# Patient Record
Sex: MALE | Race: BLACK OR AFRICAN AMERICAN | Employment: PART TIME | URBAN - METROPOLITAN AREA
[De-identification: names, ages, dates, MRNs, and addresses within clinical notes are randomized per-mention and may not be internally consistent; named-entity substitution may affect disease eponyms.]

---

## 2024-01-26 PROBLEM — D64.9 ANEMIA, UNSPECIFIED TYPE: Status: ACTIVE | Noted: 2024-01-26

## 2024-01-26 PROBLEM — D69.6 THROMBOCYTOPENIA (HCC): Status: ACTIVE | Noted: 2024-01-26

## 2024-01-26 NOTE — H&P (VIEW-ONLY)
REFERRING PHYSICIAN: Dr. Ackerman ref. provider found    HPI:         Thank you very much for requesting me to see the patient.    As you know, Kai Hatfield is a 68 year old male who presents today with complains of progressive HANDY for last several weeks and chest pain/pressure.  Relates does not look at his BM's so is unaware of any BRBPR or black stools; no change in bowel habits; no abdominal pain; no wt loss. Takes daily ASA but otherwise no NSAID's. At presentation, noted to have Hb 4.6 and per ER physician, ISIS with \"brown stool with flecks of blood.\"            PMH: HTN; dyslipidemia; BPH.     PMH-GI:     As of1/26/2024: no prior colonoscopy.     SOC: single; quit tob;     Fhx: no colon ca; no ibd.         No past surgical history on file.  Social History     Socioeconomic History    Marital status: Single   Tobacco Use    Smoking status: Former     Types: Cigarettes    Smokeless tobacco: Never   Substance and Sexual Activity    Alcohol use: Yes    Drug use: Never         No current facility-administered medications for this encounter.       No Known Allergies    A comprehensive 10 point review of systems was completed.  Pertinent positives and negatives noted in the the HPI.    EXAM:  /84   Pulse 64   Temp 98.1 °F (36.7 °C) (Temporal)   Resp 16   Ht 5' 7\" (1.702 m)   Wt 170 lb (77.1 kg)   SpO2 100%   BMI 26.63 kg/m²  -Body mass index is 26.63 kg/m².  GENERAL: well developed, well nourished, in no apparent distress  SKIN: no rashes, no suspicious lesions  HEENT: anicteric; no JVD.  NECK: supple, no adenopathy, no bruits  LUNGS: clear to auscultation  CARDIO: RRR, nl s1 and s2. No murmers appreciated.  GI: Soft, NT, ND, normal BS. NO HSM, masses, hernias or bruits.  EXTREMITIES: no cyanosis, clubbing or edema    ___________________________________________________________    ASSESSMENT: In summary this is a 68 year old male who presents today with progressive generalized weakness and severe new microcytic  anemia Hb 4.6 (MCV 64.8) (normal CBC 2/2021).     ACTIVE ISSUES INCLUDE:     Severe new microcytic anemia / thrombocytopenia -- ISIS with brown stool with \"flecks of blood.\" No prior endoscopic evaluation. Normal PT / INR; normal LFT's.     -- Ddx: broad including upper gi source and colonic pathology including malignancy.   -- iron studies pending   -- transfuse to Hb > 7   -- EGD / colonoscopy with MAC (monitored anesthesia care) tomorrow or pending transfusion (would transfuse to >7 prior to start of prep).   -- empiric ppi   -- clear liquid diet tonight     2.HTN    3. Thrombocytopenia -- repeat CBC in am          The patient indicates understanding of these issues and agrees to the plan. Thank you!       Alfie Carty M.D.       [facility]  ___________________________________________________________

## 2024-01-26 NOTE — ED INITIAL ASSESSMENT (HPI)
Patient arrives ambulatory through triage with complaints shortness of breath and weakness \"for a couple weeks\".

## 2024-01-26 NOTE — ED QUICK NOTES
Orders for admission, patient is aware of plan and ready to go upstairs. Any questions, please call ED RN Akilah at extension [phone].     Patient Covid vaccination status: Unvaccinated     COVID Test Ordered in ED: SARS-CoV-2/Flu A and B/RSV by PCR (GeneXpert)    COVID Suspicion at Admission: N/A    Running Infusions:  None    Mental Status/LOC at time of transport: A&Ox4    Other pertinent information:   CIWA score: N/A   NIH score:  N/A

## 2024-01-26 NOTE — H&P
[facility] Hospitalist H&P       CC:   Chief Complaint   Patient presents with    Breathing Problem        PCP: No primary care provider on file.    History of Present Illness: Patient is a 68 year old male with PMH sig for HTN, HLD, and BPH who presented to the hospital with chest discomfort, weakness, and dyspnea on exertion. Patient states that his symptoms started a few weeks prior and have been progressively worsening. He becomes dyspneic when walking up a flight of stairs. Dizziness when standing that resolves with time and also progressive weakness. In addition he noticed that he was having some epigastric abdominal pain that started at the same time. Denied any recent NSAID use but has been taking ASA daily for about 1 year. Having some chest pressure, this he states is chronic and has been w/u in the past. No hx of prior colonoscopy. No family hx of cancer. Denies any unintentional weight loss but has been having night sweats for a few weeks. Appetite is normal. Upon arrival to the ED, initial vitals were stable. Labwork showed hemoglobin of 4.6 ( previous of 13.9) and platelets of 327. Rectal examination performed in the ED had brown stool with flecks of blood. Patient said that he has not paid attention the the color of his stool so he does not know.     PMH  Past Medical History:   Diagnosis Date    Essential hypertension         PSH  No past surgical history on file.     ALL:  No Known Allergies     Home Medications:  No outpatient medications have been marked as taking for the 1/26/24 encounter (Hospital Encounter).         Soc Hx  Social History     Tobacco Use    Smoking status: Former     Types: Cigarettes    Smokeless tobacco: Never   Substance Use Topics    Alcohol use: Yes        Fam Hx  History reviewed. No pertinent family history.    Review of Systems  Comprehensive ROS reviewed and negative except for what's stated above.     OBJECTIVE:  /77   Pulse 66   Temp 97.8 °F (36.6  °C)   Resp 16   Ht 5' 7\" (1.702 m)   Wt 170 lb (77.1 kg)   SpO2 100%   BMI 26.63 kg/m²   General:  Alert, no distress   Head:  Normocephalic               Neck: Supple   Lungs:   Clear to auscultation bilaterally.       Heart:  Regular rate and rhythm, S1, S2 normal,   Abdomen:   Soft, epigastric TTP   Extremities: Extremities normal             Diagnostic Data:    CBC/Chem  Recent Labs   Lab 01/26/24  1213   WBC 5.2   HGB 4.6*   MCV 64.8*   PLT 76.0*       Recent Labs   Lab 01/26/24  1148      K 3.9      CO2 24.0   BUN 12   CREATSERUM 0.99   GLU 89   CA 9.3       Recent Labs   Lab 01/26/24  1148   ALT <7*   AST 13   ALB 4.3       No results for input(s): \"TROP\" in the last 168 hours.    Radiology: XR CHEST AP PORTABLE  (CPT=71045)    Result Date: 1/26/2024  CONCLUSION:  1. No acute disease in the chest.    Dictated by (CST): Oleksandr Sanchez MD on 1/26/2024 at 12:32 PM     Finalized by (CST): Oleksandr Sanchez MD on 1/26/2024 at 12:33 PM             ASSESSMENT / PLAN:   Patient is a 68 year old male with PMH sig for HTN, HLD, and BPH who presented to the hospital with chest discomfort, weakness, and dyspnea on exertion.     Acute anemia  - hgb of 4.6 on admission, previous was 13.9 in 2021  - possible UGIB, has epigastric tenderness and takes ASA daily however no hx of prior colonoscopy and has been having night sweats for a few weeks?   - rectal exam in ED with brown stool and flecks of blood  - transfusing 2 units of PRBC  - vitals stable reviewed and stable  - CBC BID  - check iron studies, ferritin, b12  - GI consulted, appreciate recommendations  - CLD, NPO at midnight  - protonix 40 mg BID    Thrombocytopenia  - platelets of 76 on admission, previous was 327  - check iron studies, PBS, coags  - repeat in the morning, if worsened may need hem onc consult    HTN  - resume home medications    HLD  - resume home medications    BPH  - resume home medications    FN:  - IVF: NS  - Diet: CLD, NPO at  midnight    DVT Prophy: SCD  Lines: PIV    Dispo: pending clinical course    Outpatient records or previous hospital records reviewed.     Further recommendations pending patient's clinical course.  [facility] hospitalist to continue to follow patient while in house    Patient and/or patient's family given opportunity to ask questions and note understanding and agreeing with therapeutic plan as outlined    Thank You,  Lelo Mattson DO    Hospitalist with [facility] Service number: [phone]

## 2024-01-26 NOTE — ED PROVIDER NOTES
[facility]  Emergency Department Attending Note     Chief Complaint:   Chief Complaint   Patient presents with    Breathing Problem     HISTORY OF PRESENT ILLNESS:   Kai Hatfield is a 68 year old male who presents to the ED with a past medical history of hypertension presents for increasing shortness of breath over the last couple of weeks.  Denies chest pain.  Some increased fatigue.  Denies nausea vomiting diarrhea.  He is unsure if he has had any blood in his stool.  No fever or chills.  Denies any headache or vision changes or slurred speech paralysis or paresthesia.     MEDICAL & SOCIAL HISTORY:   Past Medical History:   Diagnosis Date    Essential hypertension     No past surgical history on file.   Social History     Socioeconomic History    Marital status: Single   Tobacco Use    Smoking status: Former     Types: Cigarettes    Smokeless tobacco: Never   Substance and Sexual Activity    Alcohol use: Yes    Drug use: Never    No Known Allergies   No current outpatient medications on file.          REVIEW OF SYSTEMS   A 10 point review of systems was completed and is negative except as listed in history of present illness      PHYSICAL EXAM   Vitals: /69   Pulse 69   Temp 97.8 °F (36.6 °C)   Resp (!) 27   Ht 170.2 cm (5' 7\")   Wt 77.1 kg   SpO2 100%   BMI 26.63 kg/m²   /69   Pulse 69   Temp 97.8 °F (36.6 °C)   Resp (!) 27   Ht 170.2 cm (5' 7\")   Wt 77.1 kg   SpO2 100%   BMI 26.63 kg/m²     General: A&Ox3, NAD  Constitutional: Well developed, well nourished, nontoxic  Head: atraumatic, normocephalic   Eyes: conjuctiva clear, no icterus, PERRL, EOMI, vision grossly normal  Ears: normal external appearance, no drainage  Nose:  Atraumatic, no swelling, no drainage, nares patent  Throat:  Moist pink mucous membranes, airway is patent  Neck:  Soft supple, no masses, no tracheal deviation, no stridor  Chest:  No bruising or abrasions, no tenderness, no deformity  Cardiac:  Regular  rate and rhythm, no murmurs rubs or gallops.  Lung:  No distress, no retractions. Clear to auscultation bilaterally, no w/r/r  Abdomen:  Soft, nontender, nondistended, normal BS rectal exam with brown stool and flecks of blood no active bleeding  Back: No stepoff/deformity  Extremities: FROM all ext, no deformities, intact equal peripheral pulses, no cyanosis or edema  Neuro: No facial droop, no slurred speech, moving all extremities freely, SILT to the bilateral upper and lower extremities  Psych: A&Ox3, normal affect, cooperative, calm  Skin: No rash, no petechiae/purpura, warm, dry      RESULTS  LABS:   Results for orders placed or performed during the hospital encounter of 01/26/24   Comp Metabolic Panel (14)   Result Value Ref Range    Glucose 89 70 - 99 mg/dL    Sodium 138 136 - 145 mmol/L    Potassium 3.9 3.5 - 5.1 mmol/L    Chloride 106 98 - 112 mmol/L    CO2 24.0 21.0 - 32.0 mmol/L    Anion Gap 8 0 - 18 mmol/L    BUN 12 9 - 23 mg/dL    Creatinine 0.99 0.70 - 1.30 mg/dL    BUN/CREA Ratio 12.1 10.0 - 20.0    Calcium, Total 9.3 8.7 - 10.4 mg/dL    Calculated Osmolality 285 275 - 295 mOsm/kg    eGFR-Cr 83 >=60 mL/min/1.73m2    ALT <7 (L) 10 - 49 U/L    AST 13 <=34 U/L    Alkaline Phosphatase 58 45 - 117 U/L    Bilirubin, Total 0.7 0.2 - 1.1 mg/dL    Total Protein 7.2 5.7 - 8.2 g/dL    Albumin 4.3 3.2 - 4.8 g/dL    Globulin  2.9 2.8 - 4.4 g/dL    A/G Ratio 1.5 1.0 - 2.0   Troponin I (High Sensitivity)   Result Value Ref Range    Troponin I (High Sensitivity) 4 <=53 ng/L   BNP (Brain Natriuretic Peptide)   Result Value Ref Range    Beta Natriuretic Peptide 51 <100 pg/mL   Scan slide   Result Value Ref Range    Slide Review       Platelets reviewed on smear. No giant platelets or platelet clumps observed.   EKG 12 Lead   Result Value Ref Range    Ventricular rate 67 BPM    Atrial rate 67 BPM    P-R Interval 178 ms    QRS Duration 70 ms    Q-T Interval 372 ms    QTC Calculation (Bezet) 393 ms    P Axis 61 degrees     R Axis -9 degrees    T Axis 8 degrees   RAINBOW DRAW BLUE   Result Value Ref Range    Hold Blue Auto Resulted    SARS-CoV-2/Flu A and B/RSV by PCR (GeneXpert)    Specimen: Nares; Other   Result Value Ref Range    SARS-CoV-2 (COVID-19) - (GeneXpert) Not Detected Not Detected    Influenza A by PCR Negative Negative    Influenza B by PCR Negative Negative    RSV by PCR Negative Negative   CBC W/ DIFFERENTIAL   Result Value Ref Range    WBC 5.2 4.0 - 11.0 x10(3) uL    RBC 2.67 (L) 3.80 - 5.80 x10(6)uL    HGB 4.6 (LL) 13.0 - 17.5 g/dL    HCT 17.3 (L) 39.0 - 53.0 %    MCV 64.8 (L) 80.0 - 100.0 fL    MCH 17.2 (L) 26.0 - 34.0 pg    MCHC 26.6 (L) 31.0 - 37.0 g/dL    RDW-SD 67.4 (H) 35.1 - 46.3 fL    RDW 30.6 (H) 11.0 - 15.0 %    PLT 76.0 (L) 150.0 - 450.0 10(3)uL    Neutrophil Absolute Prelim 3.30 1.50 - 7.70 x10 (3) uL         IMAGING: XR CHEST AP PORTABLE  (CPT=71045)    Result Date: 1/26/2024  CONCLUSION:  1. No acute disease in the chest.    Dictated by (CST): Oleksandr Sanchez MD on 1/26/2024 at 12:32 PM     Finalized by (CST): Oleksandr Sanchez MD on 1/26/2024 at 12:33 PM           I personally reviewed the radiology study and my finding were no acute intrathoracic process      Procedures:   Procedures    EKG: Normal sinus rhythm with a normal rate of 67, normal intervals, normal QRS, nonspecific ST-T wave changes without overt signs of acute ischemia, no old immediately available for comparison       ED COURSE          Re-Evaluation: Improved      Disposition & Plan:   Clinical Impression/Final Diagnosis:   1. Anemia, unspecified type        Medical Decision Making: Patient noted to be profoundly anemic, not tachycardic or hypotensive, at this time not tachypneic either, not short of breath when he is at rest, only exertional.  Concern for symptomatic anemia will emergently transfused 2 units of packed red blood cells.  No active GI bleed GI is on consult will give Protonix.  Admit for further workup and  management    Medical Decision Making  Amount and/or Complexity of Data Reviewed  Independent Historian:      Details: Outside hospital records  External Data Reviewed: notes.  Labs: ordered. Decision-making details documented in ED Course.  Radiology: ordered and independent interpretation performed. Decision-making details documented in ED Course.  ECG/medicine tests: ordered and independent interpretation performed. Decision-making details documented in ED Course.    Risk  OTC drugs.  Prescription drug management.  Decision regarding hospitalization.    Critical Care  Total time providing critical care: 42 minutes        Disposition: Admit  There are no disposition comments on file for this visit.     This note was generated in part using voice recognition dictation technology. The report was reviewed by this physician but still may have unintentional errors due to inherent limitations of voice recognition technology. All times are estimates.

## 2024-01-26 NOTE — CONSULTS
REFERRING PHYSICIAN: Dr. Ackerman ref. provider found    HPI:         Thank you very much for requesting me to see the patient.    As you know, Kai Hatfield is a 68 year old male who presents today with complains of progressive HANDY for last several weeks and chest pain/pressure.  Relates does not look at his BM's so is unaware of any BRBPR or black stools; no change in bowel habits; no abdominal pain; no wt loss. Takes daily ASA but otherwise no NSAID's. At presentation, noted to have Hb 4.6 and per ER physician, ISIS with \"brown stool with flecks of blood.\"            PMH: HTN; dyslipidemia; BPH.     PMH-GI:     As of1/26/2024: no prior colonoscopy.     SOC: single; quit tob;     Fhx: no colon ca; no ibd.         No past surgical history on file.  Social History     Socioeconomic History    Marital status: Single   Tobacco Use    Smoking status: Former     Types: Cigarettes    Smokeless tobacco: Never   Substance and Sexual Activity    Alcohol use: Yes    Drug use: Never         No current facility-administered medications for this encounter.       No Known Allergies    A comprehensive 10 point review of systems was completed.  Pertinent positives and negatives noted in the the HPI.    EXAM:  /84   Pulse 64   Temp 98.1 °F (36.7 °C) (Temporal)   Resp 16   Ht 5' 7\" (1.702 m)   Wt 170 lb (77.1 kg)   SpO2 100%   BMI 26.63 kg/m²  -Body mass index is 26.63 kg/m².  GENERAL: well developed, well nourished, in no apparent distress  SKIN: no rashes, no suspicious lesions  HEENT: anicteric; no JVD.  NECK: supple, no adenopathy, no bruits  LUNGS: clear to auscultation  CARDIO: RRR, nl s1 and s2. No murmers appreciated.  GI: Soft, NT, ND, normal BS. NO HSM, masses, hernias or bruits.  EXTREMITIES: no cyanosis, clubbing or edema    ___________________________________________________________    ASSESSMENT: In summary this is a 68 year old male who presents today with progressive generalized weakness and severe new microcytic  anemia Hb 4.6 (MCV 64.8) (normal CBC 2/2021).     ACTIVE ISSUES INCLUDE:     Severe new microcytic anemia / thrombocytopenia -- ISIS with brown stool with \"flecks of blood.\" No prior endoscopic evaluation. Normal PT / INR; normal LFT's.     -- Ddx: broad including upper gi source and colonic pathology including malignancy.   -- iron studies pending   -- transfuse to Hb > 7   -- EGD / colonoscopy with MAC (monitored anesthesia care) tomorrow or pending transfusion (would transfuse to >7 prior to start of prep).   -- empiric ppi   -- clear liquid diet tonight     2.HTN    3. Thrombocytopenia -- repeat CBC in am          The patient indicates understanding of these issues and agrees to the plan. Thank you!       Alfie Carty M.D.       [facility]  ___________________________________________________________

## 2024-01-27 NOTE — PROGRESS NOTES
[facility] Hospitalist Progress Note     CC: Hospital Follow up    PCP: No primary care provider on file.       Assessment/Plan:   68 year old male with PMH sig for HTN, HLD, and BPH who presented to the hospital with chest discomfort, weakness, and dyspnea on exertion.      Acute anemia  Severe iron deficiency anemia   - hgb of 4.6 on admission, previous was 13.9 in 2021  - transfused 3 units of PRBC  - check iron studies, ferritin, b12  - GI consulted, appreciate recommendations  - ok to resume diet, EGD with normal findings, as was colonoscopy   - will need capsule small bowel study, either inpatient or outpatient  - will see how he is by tomorrow and decide on DC timing  - will give IV iron while here few doses   - protonix 40 mg BID     Thrombocytopenia  - platelets of 76 on admission, previous was 327  - check iron studies, PBS, coags  - repeat in the morning, if worsened may need hem onc consult     HTN  - hold losartan today      HLD  - can hold his statin        Fluids: can stop IV fluids  DVT prophylaxis: hold   Code status: FULL    Asrar Marco HURST  [facility] Hospitalist      Subjective:     Feels better today     OBJECTIVE:    Blood pressure 135/81, pulse 61, temperature 97.3 °F (36.3 °C), resp. rate 22, height 5' 7\" (1.702 m), weight 172 lb 13.5 oz (78.4 kg), SpO2 99%.    Temp:  [96.8 °F (36 °C)-98.1 °F (36.7 °C)] 97.3 °F (36.3 °C)  Pulse:  [52-89] 61  Resp:  [12-22] 22  BP: ()/(48-91) 135/81  SpO2:  [97 %-100 %] 99 %      Intake/Output:    Intake/Output Summary (Last 24 hours) at 1/27/2024 1326  Last data filed at 1/27/2024 0323  Gross per 24 hour   Intake 1223.75 ml   Output 300 ml   Net 923.75 ml       Last 3 Weights   01/26/24 2312 172 lb 13.5 oz (78.4 kg)   01/26/24 1044 170 lb (77.1 kg)       /81   Pulse 61   Temp 97.3 °F (36.3 °C)   Resp 22   Ht 5' 7\" (1.702 m)   Wt 172 lb 13.5 oz (78.4 kg)   SpO2 99%   BMI 27.07 kg/m²   General: Alert, no acute  distres  Neck: non tender, no adenopathy   Lungs: clear to ausculation bilaterally  Heart: Regular rate and rhythm  Extremities: No edema        Data Review:       Labs:     Recent Labs   Lab 01/26/24  1213 01/26/24 2013 01/26/24  2319 01/27/24  0300 01/27/24  0943   RBC 2.67*  --  3.36* 3.61* 4.21   HGB 4.6*   < > 6.8* 7.5* 9.0*   HCT 17.3*   < > 23.8* 26.1* 30.5*   MCV 64.8*  --  70.8* 72.3* 72.4*   MCH 17.2*  --  20.2* 20.8* 21.4*   MCHC 26.6*  --  28.6* 28.7* 29.5*   RDW 30.6*  --  30.6* 29.4* 29.9*   NEPRELIM 3.30  --  4.11 3.57  --    WBC 5.2  --  6.6 6.1 7.8   PLT 76.0*  --  94.0* 97.0* 118.0*    < > = values in this interval not displayed.         Recent Labs   Lab 01/26/24  1148 01/27/24  0300   GLU 89 87   BUN 12 9   CREATSERUM 0.99 0.99   EGFRCR 83 83   CA 9.3 8.8    144   K 3.9 4.3    114*   CO2 24.0 25.0       Recent Labs   Lab 01/26/24  1148 01/27/24  0300   ALT <7* 9*   AST 13 14   ALB 4.3 3.9         Imaging:  XR CHEST AP PORTABLE  (CPT=71045)    Result Date: 1/26/2024  CONCLUSION:  1. No acute disease in the chest.    Dictated by (CST): Oleksandr Sanchez MD on 1/26/2024 at 12:32 PM     Finalized by (CST): Oleksandr Sanchez MD on 1/26/2024 at 12:33 PM             Meds:      sodium chloride   Intravenous Once    pantoprazole  40 mg Intravenous Q12H      lactated ringers      sodium chloride 83 mL/hr at 01/27/24 1324     naloxone, ondansetron, metoclopramide

## 2024-01-27 NOTE — ANESTHESIA POSTPROCEDURE EVALUATION
Patient: Kai Hatfield    Procedure Summary       Date: 01/27/24 Room / Location: [facility] ENDOSCOPY 01 / [facility] ENDOSCOPY    Anesthesia Start: 1145 Anesthesia Stop:     Procedures:       ESOPHAGOGASTRODUODENOSCOPY (EGD)      COLONOSCOPY Diagnosis: (normal egd, colon polyp, hemorrhoids)    Surgeons: Alfie Carty MD Anesthesiologist: Dakota Lindquist MD    Anesthesia Type: MAC ASA Status: 3            Anesthesia Type: No value filed.    Vitals Value Taken Time   /48 01/27/24 1220   Temp 97.3 °F (36.3 °C) 01/27/24 1220   Pulse 78 01/27/24 1220   Resp 14 01/27/24 1220   SpO2 97 % 01/27/24 1220       [facility] AN Post Evaluation:   Patient Evaluated in PACU  Patient Participation: complete - patient participated  Level of Consciousness: awake  Pain Management: adequate  Airway Patency:patent  Dental exam unchanged from preop  Yes    Cardiovascular Status: acceptable  Respiratory Status: acceptable  Postoperative Hydration acceptable      DAKOTA LINDQUIST MD  1/27/2024 12:21 PM

## 2024-01-27 NOTE — OPERATIVE REPORT
ENDOSCOPY OPERATIVE REPORT  Patient Name: Kai Hatfield  Medical Record #: [id number]  YOB: 1955  Date of Procedure: 1/27/2024    Preoperative Diagnosis: severe new microcytic anemia; rectal bleeding.   Postoperative Diagnosis:       1.) Normal EGD. Duodenal biopsies obtained.     2.) Diminutive 3 mm hepatic flexure polyp = removed.     3.) Normal colon otherwise. Normal terminal ileum.    4.) Internal hemorrhoids.   Procedure Performed: Esophagogastroduodenoscopy with biopsy and Colonoscopy with biopsy. Withdrawal time: 10  minutes.   ASA Class: 2. Anesthesia Given: MAC. Moderate sedation time: NA. Deep sedation was provided by anesthesiologist.   Endoscopist: Alfie Carty MD  Procedure: After the patient was interviewed and the procedure again discussed and questions addressed, the patient was brought to the GI Lab and monitoring of the B/P, pulse, and pulse oximetry was performed. The patient was then placed in the left lateral decubitus position and sedated with divided doses of IV medication; continuous vital signs were monitored throughout the procedure. A time-out procedure was performed, history and physical were reviewed, medical reconciliation was complete, informed consent was obtained.   The Olympus videogastroscope was intubated into the esophagus and advanced into the duodenum under directed vision without difficulty. Then withdrawn. The patient was repositioned and prepared for the colonoscopy. The scope was inserted through the rectum and advanced to the cecum as identified by the appendiceal orifice and ileocecal valve.  The terminal ileum was intubated and was normal. The quality of the preparation was Aronchick score 1. A thorough exam was performed throughout the procedures. The patient tolerated the procedures well.   Aronchick Bowel Prep:  Score:  1 = Excellent (>95% mucosa seen)   2 = Good (clear liquid up to 25% of mucosa, 90% mucosa seen)   3 = fair (semisolid stool not  suctioned, but 90% mucosa seen)   4 = poor (semisolid stool not suctioned, <90% mucosa seen)   5 = inadequate (repeat prep needed).  FINDINGS: The esophagus mucosa had an overall normal appearance. The gastric lumen was then entered and views of the gastric mucosa as well as retroverted views of the fundus.  A normal pylorus was passed and the duodenal bulb entered, the duodenal bulb and post-bulbar duodenum were unremarkable. Biopsies from the second part of duodenum were obtained. The scope was then withdrawn and the procedure terminated.   A colonoscopy was then performed. A single small polyp was noted as above and removed using cold biopsy forceps. No other pathology was appreciated. The overall visualized mucosal pattern of the colon was unremarkable. At the anal verge the endoscope was retroverted, internal hemorrhoids, no other abnormalities were indentified.  The procedure was tolerated well and upon completion and throughtout the vital signs were stable.  COMPLICATIONS: None.  PLAN: 1.) Await biopsy results.    2.) recommend wireless capsule endoscopy (maybe done as out-patient)   3.) resume diet.  Alfie Carty M.D.  [facility]

## 2024-01-27 NOTE — ANESTHESIA PREPROCEDURE EVALUATION
Anesthesia PreOp Note    HPI:     Kai Hatfield is a 68 year old male who presents for preoperative consultation requested by: Alfie Carty MD    Date of Surgery: 1/26/2024 - 1/27/2024    Procedure(s):  ESOPHAGOGASTRODUODENOSCOPY (EGD)  COLONOSCOPY  Indication: severe anemia    Relevant Problems   No relevant active problems       NPO:  Last Liquid Consumption Date: 12/27/24 (colytly prep)  Last Liquid Consumption Time: 0920 (rest of colytley prep)  Last Solid Consumption Date: 12/26/24  Last Solid Consumption Time: 0900  Last Liquid Consumption Date: 12/27/24 (colytly prep)          History Review:  Patient Active Problem List    Diagnosis Date Noted    Thrombocytopenia (HCC) 01/26/2024    Anemia, unspecified type 01/26/2024       Past Medical History:   Diagnosis Date    Essential hypertension     High blood pressure     High cholesterol        History reviewed. No pertinent surgical history.    Medications Prior to Admission   Medication Sig Dispense Refill Last Dose    aspirin 81 MG Oral Chew Tab Chew 1 tablet (81 mg total) by mouth daily.       losartan 50 MG Oral Tab Take 1 tablet (50 mg total) by mouth daily.        Current Facility-Administered Medications Ordered in Epic   Medication Dose Route Frequency Provider Last Rate Last Admin    sodium chloride 0.9% infusion   Intravenous Once Mert Wren MD        sodium chloride 0.9% infusion   Intravenous Continuous Lelo Mattson,  83 mL/hr at 01/26/24 2117 New Bag at 01/26/24 2117    ondansetron (Zofran) 4 MG/2ML injection 4 mg  4 mg Intravenous Q6H PRN eLlo Mattson,         metoclopramide (Reglan) 5 mg/mL injection 10 mg  10 mg Intravenous Q8H PRN Lelo Mattson,         pantoprazole (Protonix) 40 mg in sodium chloride 0.9% PF 10 mL IV push  40 mg Intravenous Q12H Lelo Mattson, DO         No current [facility]-ordered outpatient medications on file.       No Known Allergies    History reviewed. No pertinent family  history.  Social History     Socioeconomic History    Marital status: Single   Tobacco Use    Smoking status: Former     Types: Cigarettes    Smokeless tobacco: Never   Substance and Sexual Activity    Alcohol use: Yes    Drug use: Never       Available pre-op labs reviewed.  Lab Results   Component Value Date    WBC 7.8 01/27/2024    RBC 4.21 01/27/2024    HGB 9.0 (L) 01/27/2024    HCT 30.5 (L) 01/27/2024    MCV 72.4 (L) 01/27/2024    MCH 21.4 (L) 01/27/2024    MCHC 29.5 (L) 01/27/2024    RDW 29.9 (H) 01/27/2024    .0 (L) 01/27/2024     Lab Results   Component Value Date     01/27/2024    K 4.3 01/27/2024     (H) 01/27/2024    CO2 25.0 01/27/2024    BUN 9 01/27/2024    CREATSERUM 0.99 01/27/2024    GLU 87 01/27/2024    PGLU 95 01/27/2024    CA 8.8 01/27/2024     Lab Results   Component Value Date    INR 1.06 01/26/2024       Vital Signs:  Body mass index is 27.07 kg/m².   height is 1.702 m (5' 7\") and weight is 78.4 kg (172 lb 13.5 oz). His tympanic temperature is 97 °F (36.1 °C). His blood pressure is 145/88 and his pulse is 69. His respiration is 14 and oxygen saturation is 100%.   Vitals:    01/27/24 0800 01/27/24 0849 01/27/24 0900 01/27/24 1000   BP: 120/82  157/90 145/88   Pulse: 69  52 69   Resp: 18   14   Temp:  97 °F (36.1 °C)     TempSrc:  Tympanic     SpO2: 100%  100% 100%   Weight:       Height:            Anesthesia Evaluation     Patient summary reviewed and Nursing notes reviewed    Airway   Dental      Pulmonary - negative ROS   Cardiovascular   (+) hypertension    Neuro/Psych - negative ROS     GI/Hepatic/Renal      Comments: GI bleed, anemia    Endo/Other    Abdominal                  Anesthesia Plan:   ASA:  3  Plan:   MAC  Post-op Pain Management: IV analgesics  Informed Consent Plan and Risks Discussed With:  Patient  Use of Blood Products Discussed With:  Patient  Blood Product Use Consented    Discussed plan with:  Surgeon      I have informed Kai Hatfield and/or legal  guardian or family member of the nature of the anesthetic plan, benefits, risks including possible dental damage if relevant, major complications, and any alternative forms of anesthetic management.   All of the patient's questions were answered to the best of my ability. The patient desires the anesthetic management as planned.  CATRINA LINDQUIST MD  1/27/2024 11:32 AM  Present on Admission:   Thrombocytopenia (HCC)

## 2024-01-27 NOTE — PLAN OF CARE
Pt is alert & or x 3, following cammands, consents on chart for EGD and colonoscopy. 1 polyp removed and EGD NORmal from report from Endo Rn Kae. Pt is alert & or x 4, following cammands, up to bathroom, colytely finished stools were clear watery like in am before procedure., sinus r. . Pt was NPO, Dr Hameed advanc to cardiac diet. Pt tolerated diet well. No nausea, no c/o of pain. Up to bathroom serveral times in am.  Dr HAMEED updated pt of procedure. Family at bedside.   No skin issues with pt.   Repeat hgb 9.0 this am. Pt will transfer to medical floor when bed is avail. Pt and family updated of this.  Pt is up to bathroom, min assist, no assistive devices needed.  Steady on his feet.  Problem: Patient Centered Care  Goal: Patient preferences are identified and integrated in the patient's plan of care  Description: Interventions:  - What would you like us to know as we care for you?  - Provide timely, complete, and accurate information to patient/family  - Incorporate patient and family knowledge, values, beliefs, and cultural backgrounds into the planning and delivery of care  - Encourage patient/family to participate in care and decision-making at the level they choose  - Honor patient and family perspectives and choices  Outcome: Progressing     Problem: GASTROINTESTINAL - ADULT  Goal: Maintains or returns to baseline bowel function  Description: INTERVENTIONS:  - Assess bowel function  - Maintain adequate hydration with IV or PO as ordered and tolerated  - Evaluate effectiveness of GI medications  - Encourage mobilization and activity  - Obtain nutritional consult as needed  - Establish a toileting routine/schedule  - Consider collaborating with pharmacy to review patient's medication profile  Outcome: Progressing     Problem: SKIN/TISSUE INTEGRITY - ADULT  Goal: Skin integrity remains intact  Description: INTERVENTIONS  - Assess and document risk factors for pressure ulcer development  - Assess and document  skin integrity  - Monitor for areas of redness and/or skin breakdown  - Initiate interventions, skin care algorithm/standards of care as needed  Outcome: Progressing

## 2024-01-27 NOTE — PROGRESS NOTES
Double RN skin check done prior to transfer off Unit. Skin check performed by this RN and len ferrari    Wounds are as follows:no wounds present. Pt texted his daughter haley  room # [phone].    Will remain available for any further questions or concerns.

## 2024-01-27 NOTE — PRE-SEDATION ASSESSMENT
Physician Pre-Sedation Assessment    Pre-Sedation Assessment:    Sedation History: No Previous Sedaton Recieved and Airway Assessed    Cardiac: normal S1, S2  Respiratory: breath sounds clear bilaterally   Abdomen: soft, BS (+), non-tender    ASA Classification: 2. Patient with mild systemic disease    Plan: IV Sedation

## 2024-01-27 NOTE — INTERVAL H&P NOTE
Pre-op Diagnosis: severe anemia    The above referenced H&P was reviewed by Alfie Carty MD on 1/27/2024, the patient was examined and no significant changes have occurred in the patient's condition since the H&P was performed.  I discussed with the patient and/or legal representative the potential benefits, risks and side effects of this procedure; the likelihood of the patient achieving goals; and potential problems that might occur during recuperation.  I discussed reasonable alternatives to the procedure, including risks, benefits and side effects related to the alternatives and risks related to not receiving this procedure.  We will proceed with procedure as planned.

## 2024-01-28 NOTE — PLAN OF CARE
Patient is alert and oriented. Denies pain. On room air. Voiding freely. No reports of bloody stool. Ambulating independently. Plan for GI capsule study tomorrow, 1/29. IV iron given. Tolerating cardiac diet. Safety precautions in place.     Problem: Patient Centered Care  Goal: Patient preferences are identified and integrated in the patient's plan of care  Description: Interventions:  - What would you like us to know as we care for you?   - Provide timely, complete, and accurate information to patient/family  - Incorporate patient and family knowledge, values, beliefs, and cultural backgrounds into the planning and delivery of care  - Encourage patient/family to participate in care and decision-making at the level they choose  - Honor patient and family perspectives and choices  Outcome: Progressing     Problem: GASTROINTESTINAL - ADULT  Goal: Maintains or returns to baseline bowel function  Description: INTERVENTIONS:  - Assess bowel function  - Maintain adequate hydration with IV or PO as ordered and tolerated  - Evaluate effectiveness of GI medications  - Encourage mobilization and activity  - Obtain nutritional consult as needed  - Establish a toileting routine/schedule  - Consider collaborating with pharmacy to review patient's medication profile  Outcome: Progressing     Problem: SKIN/TISSUE INTEGRITY - ADULT  Goal: Skin integrity remains intact  Description: INTERVENTIONS  - Assess and document risk factors for pressure ulcer development  - Assess and document skin integrity  - Monitor for areas of redness and/or skin breakdown  - Initiate interventions, skin care algorithm/standards of care as needed  Outcome: Progressing     Problem: Patient/Family Goals  Goal: Patient/Family Long Term Goal  Description: Patient's Long Term Goal:     Interventions:  -   - See additional Care Plan goals for specific interventions  Outcome: Progressing  Goal: Patient/Family Short Term Goal  Description: Patient's Short Term  Goal:     Interventions:   -   - See additional Care Plan goals for specific interventions  Outcome: Progressing

## 2024-01-28 NOTE — PROGRESS NOTES
[facility] Hospitalist Progress Note     CC: Hospital Follow up    PCP: No primary care provider on file.       Assessment/Plan:   68 year old male with PMH sig for HTN, HLD, and BPH who presented to the hospital with chest discomfort, weakness, and dyspnea on exertion.      Acute anemia  Severe iron deficiency anemia   - hgb of 4.6 on admission, previous was 13.9 in 2021  - transfused 3 units of PRBC  - checked iron studies, ferritin, b12  - GI consulted, appreciate recommendations  - EGD with normal findings, as was colonoscopy   - will need capsule small bowel study, patient is agreeable to do here inpatient if offered.  - will give IV iron while here few doses   - protonix 40 mg BID     Thrombocytopenia  - platelets of 76 on admission, previous was 327  - severe iron deficiency noted     HTN  - hold losartan , BP's have been stable      HLD  - resume statin     BPH  -on flomax     Fluids: hold IV fluids  DVT prophylaxis: hold   Code status: FULL  Dispo: patient agreeable to SB capsule if can be done here, Monday. GI to see today    Gen Lara DO  [facility] Hospitalist      Subjective:     Feels ok. Discussed small bowel capsule. Hb 7.8 today. No evidence of melena of hematochezia overnight.     OBJECTIVE:    Blood pressure 123/76, pulse 61, temperature 98 °F (36.7 °C), temperature source Oral, resp. rate 20, height 5' 7\" (1.702 m), weight 172 lb 13.5 oz (78.4 kg), SpO2 99%.    Temp:  [97.3 °F (36.3 °C)-98.2 °F (36.8 °C)] 98 °F (36.7 °C)  Pulse:  [60-83] 61  Resp:  [12-22] 20  BP: (105-148)/(48-88) 123/76  SpO2:  [97 %-100 %] 99 %      Intake/Output:    Intake/Output Summary (Last 24 hours) at 1/28/2024 0948  Last data filed at 1/28/2024 0811  Gross per 24 hour   Intake 1528 ml   Output 400 ml   Net 1128 ml       Last 3 Weights   01/26/24 2312 172 lb 13.5 oz (78.4 kg)   01/26/24 1044 170 lb (77.1 kg)       /76 (BP Location: Right arm)   Pulse 61   Temp 98 °F (36.7 °C) (Oral)   Resp  20   Ht 5' 7\" (1.702 m)   Wt 172 lb 13.5 oz (78.4 kg)   SpO2 99%   BMI 27.07 kg/m²   General: Alert, no acute distres  Neck: non tender, no adenopathy   Lungs: clear to ausculation bilaterally  Heart: Regular rate and rhythm  Extremities: No edema    Data Review:       Labs:     Recent Labs   Lab 01/26/24  1213 01/26/24 2013 01/26/24  2319 01/27/24  0300 01/27/24  0943 01/28/24  0502   RBC 2.67*  --  3.36* 3.61* 4.21 3.77*   HGB 4.6*   < > 6.8* 7.5* 9.0* 7.8*   HCT 17.3*   < > 23.8* 26.1* 30.5* 27.0*   MCV 64.8*  --  70.8* 72.3* 72.4* 71.6*   MCH 17.2*  --  20.2* 20.8* 21.4* 20.7*   MCHC 26.6*  --  28.6* 28.7* 29.5* 28.9*   RDW 30.6*  --  30.6* 29.4* 29.9* 30.5*   NEPRELIM 3.30  --  4.11 3.57  --   --    WBC 5.2  --  6.6 6.1 7.8 9.6   PLT 76.0*  --  94.0* 97.0* 118.0* 176.0    < > = values in this interval not displayed.         Recent Labs   Lab 01/26/24  1148 01/27/24  0300 01/28/24  0502   GLU 89 87 95   BUN 12 9 9   CREATSERUM 0.99 0.99 1.06   EGFRCR 83 83 77   CA 9.3 8.8 9.1    144 142   K 3.9 4.3 3.7    114* 111   CO2 24.0 25.0 24.0       Recent Labs   Lab 01/26/24  1148 01/27/24  0300   ALT <7* 9*   AST 13 14   ALB 4.3 3.9         Imaging:  XR CHEST AP PORTABLE  (CPT=71045)    Result Date: 1/26/2024  CONCLUSION:  1. No acute disease in the chest.    Dictated by (CST): Oleksandr Sanchez MD on 1/26/2024 at 12:32 PM     Finalized by (CST): Oleksandr Sanchez MD on 1/26/2024 at 12:33 PM             Meds:      tamsulosin  0.4 mg Oral Daily @ 0700    atorvastatin  20 mg Oral Nightly    sodium chloride   Intravenous Once    iron sucrose  200 mg Intravenous Daily    pantoprazole  40 mg Intravenous Q12H         ondansetron, metoclopramide

## 2024-01-28 NOTE — PLAN OF CARE
No acute changes over the shift. Vitals are in the standard range. Patient is alert x4. Tolerating well to cardiac diet. Voiding freely. No complaints of any pain. Checking his hemoglobin in the morning. Call light with in reach. All safety measures in place.  Problem: Patient Centered Care  Goal: Patient preferences are identified and integrated in the patient's plan of care  Description: Interventions:  - - Provide timely, complete, and accurate information to patient/family  - Incorporate patient and family knowledge, values, beliefs, and cultural backgrounds into the planning and delivery of care  - Encourage patient/family to participate in care and decision-making at the level they choose  - Honor patient and family perspectives and choices  Outcome: Progressing     Problem: GASTROINTESTINAL - ADULT  Goal: Maintains or returns to baseline bowel function  Description: INTERVENTIONS:  - Assess bowel function  - Maintain adequate hydration with IV or PO as ordered and tolerated  - Evaluate effectiveness of GI medications  - Encourage mobilization and activity  - Obtain nutritional consult as needed  - Establish a toileting routine/schedule  - Consider collaborating with pharmacy to review patient's medication profile  Outcome: Progressing     Problem: SKIN/TISSUE INTEGRITY - ADULT  Goal: Skin integrity remains intact  Description: INTERVENTIONS  - Assess and document risk factors for pressure ulcer development  - Assess and document skin integrity  - Monitor for areas of redness and/or skin breakdown  - Initiate interventions, skin care algorithm/standards of care as needed  Outcome: Progressing     P

## 2024-01-28 NOTE — PROGRESS NOTES
GI  PROGRESS NOTE    SUBJECTIVE: no complaints; tolerating diet.       OBJECTIVE:  Temp:  [98 °F (36.7 °C)-98.2 °F (36.8 °C)] 98 °F (36.7 °C)  Pulse:  [60-83] 61  Resp:  [12-22] 20  BP: (117-148)/(72-85) 123/76  SpO2:  [98 %-100 %] 99 %  Exam  Gen: No acute distress, alert and oriented x3  Pulm: Lungs clear, normal respiratory effort  CV: Heart with regular rate and rhythm, no peripheral edema  Abd: Abdomen soft, NT; ND; (+) BS; no organomegaly, bowel sounds present    Labs:     Recent Labs   Lab 01/26/24  1148 01/26/24  1213 01/26/24  1213 01/26/24 2013 01/26/24  2319 01/27/24  0300 01/27/24  0943 01/28/24  0502   WBC  --  5.2  --   --  6.6 6.1 7.8 9.6   HGB  --  4.6*   < > 6.2* 6.8* 7.5* 9.0* 7.8*   MCV  --  64.8*  --   --  70.8* 72.3* 72.4* 71.6*   PLT  --  76.0*  --   --  94.0* 97.0* 118.0* 176.0   INR 1.06  --   --   --   --   --   --   --     < > = values in this interval not displayed.       Recent Labs   Lab 01/26/24  1148 01/27/24  0300 01/28/24  0502    144 142   K 3.9 4.3 3.7    114* 111   CO2 24.0 25.0 24.0   BUN 12 9 9   CREATSERUM 0.99 0.99 1.06   CA 9.3 8.8 9.1   GLU 89 87 95       Recent Labs   Lab 01/26/24  1148 01/27/24  0300   ALT <7* 9*   AST 13 14   ALB 4.3 3.9         Meds:      tamsulosin  0.4 mg Oral Daily @ 0700    atorvastatin  20 mg Oral Nightly    sodium chloride   Intravenous Once    iron sucrose  200 mg Intravenous Daily    pantoprazole  40 mg Intravenous Q12H       ondansetron, metoclopramide    _______________________________________________________________    IMPRESSION: Pt is a 68 M presently admitted 1/26/2024 with progressive generalized weakness and severe new microcytic anemia Hb 4.6 (MCV 64.8) (normal CBC 2/2021).      ACTIVE ISSUES INCLUDE:      Severe new microcytic anemia / thrombocytopenia -- ISIS with brown stool with \"flecks of blood.\" No prior endoscopic evaluation. Normal PT / INR; normal LFT's.      -- 1/27/2024: Normal EGD / normal colonoscopy except for  a diminutive polyp/internal hemorrhoids. Normal terminal ileum.  -- recommend wireless capsule endoscopy tomorrow -- I/r including small change of capsule retention discussed with patient.      2.HTN     3. Thrombocytopenia -- repeat CBC in am --> resolved.     Alfie Carty M.D.  [facility]   _______________________________________________________________

## 2024-01-29 NOTE — PLAN OF CARE
VSS, afebrile. Swallowed video capsule this am. Advanced diet to general this afternoon, tolerating. BM x 1, no signs of blood per patient. Ambulating independently. No c/o pain. Voiding WNL. Safety plan in place, calling appropriately for assistance. Home upon discharge.       Problem: Patient Centered Care  Goal: Patient preferences are identified and integrated in the patient's plan of care  Description: Interventions:  - What would you like us to know as we care for you?   - Provide timely, complete, and accurate information to patient/family  - Incorporate patient and family knowledge, values, beliefs, and cultural backgrounds into the planning and delivery of care  - Encourage patient/family to participate in care and decision-making at the level they choose  - Honor patient and family perspectives and choices  Outcome: Progressing     Problem: GASTROINTESTINAL - ADULT  Goal: Maintains or returns to baseline bowel function  Description: INTERVENTIONS:  - Assess bowel function  - Maintain adequate hydration with IV or PO as ordered and tolerated  - Evaluate effectiveness of GI medications  - Encourage mobilization and activity  - Obtain nutritional consult as needed  - Establish a toileting routine/schedule  - Consider collaborating with pharmacy to review patient's medication profile  Outcome: Progressing     Problem: SKIN/TISSUE INTEGRITY - ADULT  Goal: Skin integrity remains intact  Description: INTERVENTIONS  - Assess and document risk factors for pressure ulcer development  - Assess and document skin integrity  - Monitor for areas of redness and/or skin breakdown  - Initiate interventions, skin care algorithm/standards of care as needed  Outcome: Progressing     Problem: Patient/Family Goals  Goal: Patient/Family Short Term Goal  Description: Patient's Short Term Goal: figure out if I am bleeding  Go home upon discharge    Monitor pain  Monitor VS  Monitor labs  administer medications  Keep patient updated  on plan of care  Attentive listening     - See additional Care Plan goals for specific interventions  Outcome: Progressing

## 2024-01-29 NOTE — PROGRESS NOTES
[facility] Hospitalist Progress Note     CC: Hospital Follow up    PCP: No primary care provider on file.       Assessment/Plan:   68 year old male with PMH sig for HTN, HLD, and BPH who presented to the hospital with chest discomfort, weakness, and dyspnea on exertion.      Acute anemia  Severe iron deficiency anemia   - hgb of 4.6 on admission, previous was 13.9 in 2021  - transfused 3 units of PRBC  - checked iron studies, ferritin, b12  - GI consulted, appreciate recommendations  - EGD with normal findings, as was colonoscopy   - capsule small bowel study, patient is agreeable to do here inpatient, started study this AM  - will give IV iron while here few doses (3 were scheduled)  - protonix 40 mg BID     Thrombocytopenia  - platelets of 76 on admission, previous was 327  - severe iron deficiency noted     HTN  - hold losartan , BP's have been stable      HLD  - resume statin     BPH  -on flomax     DVT prophylaxis: hold   Code status: FULL  Dispo: after small bowel capsule work up completed.     Gen Lara DO  [facility] Hospitalist      Subjective:     Feels ok.    OBJECTIVE:    Blood pressure 112/72, pulse 72, temperature 98 °F (36.7 °C), temperature source Oral, resp. rate 18, height 5' 7\" (1.702 m), weight 172 lb 13.5 oz (78.4 kg), SpO2 99%.    Temp:  [98 °F (36.7 °C)-100.5 °F (38.1 °C)] 98 °F (36.7 °C)  Pulse:  [61-74] 72  Resp:  [18] 18  BP: (112-134)/(68-81) 112/72  SpO2:  [97 %-100 %] 99 %      Intake/Output:    Intake/Output Summary (Last 24 hours) at 1/29/2024 1057  Last data filed at 1/28/2024 2021  Gross per 24 hour   Intake 500 ml   Output --   Net 500 ml       Last 3 Weights   01/26/24 2312 172 lb 13.5 oz (78.4 kg)   01/26/24 1044 170 lb (77.1 kg)       /72 (BP Location: Right arm)   Pulse 72   Temp 98 °F (36.7 °C) (Oral)   Resp 18   Ht 5' 7\" (1.702 m)   Wt 172 lb 13.5 oz (78.4 kg)   SpO2 99%   BMI 27.07 kg/m²   General: Alert, no acute distres  Neck: non tender,  no adenopathy   Lungs: clear to ausculation bilaterally  Heart: Regular rate and rhythm  Extremities: No edema    Data Review:       Labs:     Recent Labs   Lab 01/26/24  2319 01/27/24  0300 01/27/24  0943 01/28/24  0502 01/29/24  0604   RBC 3.36* 3.61* 4.21 3.77* 3.79*   HGB 6.8* 7.5* 9.0* 7.8* 8.4*   HCT 23.8* 26.1* 30.5* 27.0* 27.4*   MCV 70.8* 72.3* 72.4* 71.6* 72.3*   MCH 20.2* 20.8* 21.4* 20.7* 22.2*   MCHC 28.6* 28.7* 29.5* 28.9* 30.7*   RDW 30.6* 29.4* 29.9* 30.5*  --    NEPRELIM 4.11 3.57  --   --  8.66*   WBC 6.6 6.1 7.8 9.6 12.1*   PLT 94.0* 97.0* 118.0* 176.0 314.0         Recent Labs   Lab 01/26/24  1148 01/27/24  0300 01/28/24  0502   GLU 89 87 95   BUN 12 9 9   CREATSERUM 0.99 0.99 1.06   EGFRCR 83 83 77   CA 9.3 8.8 9.1    144 142   K 3.9 4.3 3.7    114* 111   CO2 24.0 25.0 24.0       Recent Labs   Lab 01/26/24  1148 01/27/24  0300   ALT <7* 9*   AST 13 14   ALB 4.3 3.9         Imaging:  XR CHEST AP PORTABLE  (CPT=71045)    Result Date: 1/26/2024  CONCLUSION:  1. No acute disease in the chest.    Dictated by (CST): Oleksandr Sanchez MD on 1/26/2024 at 12:32 PM     Finalized by (CST): Oleksandr Sanchez MD on 1/26/2024 at 12:33 PM             Meds:      tamsulosin  0.4 mg Oral Daily @ 0700    atorvastatin  20 mg Oral Nightly    sodium chloride   Intravenous Once    iron sucrose  200 mg Intravenous Daily    pantoprazole  40 mg Intravenous Q12H         ondansetron, metoclopramide

## 2024-01-29 NOTE — PAYOR COMM NOTE
--------------  ADMISSION REVIEW     Payor: DARIEN WOODSON [facility]  Subscriber #:  [id number]  Authorization Number: [id number]    Admit date: 1/26/24  Admit time:  6:23 PM       REVIEW DOCUMENTATION:     ED Provider Notes        [facility]  Emergency Department Attending Note     Chief Complaint:   Chief Complaint   Patient presents with    Breathing Problem     HISTORY OF PRESENT ILLNESS:   Kai Hatfield is a 68 year old male who presents to the ED with a past medical history of hypertension presents for increasing shortness of breath over the last couple of weeks.  Denies chest pain.  Some increased fatigue.  Denies nausea vomiting diarrhea.  He is unsure if he has had any blood in his stool.  No fever or chills.  Denies any headache or vision changes or slurred speech paralysis or paresthesia.     MEDICAL & SOCIAL HISTORY:                  PHYSICAL EXAM   Vitals: /69   Pulse 69   Temp 97.8 °F (36.6 °C)   Resp (!) 27   Ht 170.2 cm (5' 7\")   Wt 77.1 kg   SpO2 100%   BMI 26.63 kg/m²   /69   Pulse 69   Temp 97.8 °F (36.6 °C)   Resp (!) 27   Ht 170.2 cm (5' 7\")   Wt 77.1 kg   SpO2 100%   BMI 26.63 kg/m²     General: A&Ox3, NAD  Constitutional: Well developed, well nourished, nontoxic  Head: atraumatic, normocephalic   Eyes: conjuctiva clear, no icterus, PERRL, EOMI, vision grossly normal  Ears: normal external appearance, no drainage  Nose:  Atraumatic, no swelling, no drainage, nares patent  Throat:  Moist pink mucous membranes, airway is patent  Neck:  Soft supple, no masses, no tracheal deviation, no stridor  Chest:  No bruising or abrasions, no tenderness, no deformity  Cardiac:  Regular rate and rhythm, no murmurs rubs or gallops.  Lung:  No distress, no retractions. Clear to auscultation bilaterally, no w/r/r  Abdomen:  Soft, nontender, nondistended, normal BS rectal exam with brown stool and flecks of blood no active bleeding  Back: No stepoff/deformity  Extremities: FROM all ext, no  deformities, intact equal peripheral pulses, no cyanosis or edema  Neuro: No facial droop, no slurred speech, moving all extremities freely, SILT to the bilateral upper and lower extremities  Psych: A&Ox3, normal affect, cooperative, calm  Skin: No rash, no petechiae/purpura, warm, dry      RESULTS  LABS:   Results for orders placed or performed during the hospital encounter of 01/26/24   Comp Metabolic Panel (14)   Result Value Ref Range    Glucose 89 70 - 99 mg/dL    Sodium 138 136 - 145 mmol/L    Potassium 3.9 3.5 - 5.1 mmol/L    Chloride 106 98 - 112 mmol/L    CO2 24.0 21.0 - 32.0 mmol/L    Anion Gap 8 0 - 18 mmol/L    BUN 12 9 - 23 mg/dL    Creatinine 0.99 0.70 - 1.30 mg/dL    BUN/CREA Ratio 12.1 10.0 - 20.0    Calcium, Total 9.3 8.7 - 10.4 mg/dL    Calculated Osmolality 285 275 - 295 mOsm/kg    eGFR-Cr 83 >=60 mL/min/1.73m2    ALT <7 (L) 10 - 49 U/L    AST 13 <=34 U/L    Alkaline Phosphatase 58 45 - 117 U/L    Bilirubin, Total 0.7 0.2 - 1.1 mg/dL    Total Protein 7.2 5.7 - 8.2 g/dL    Albumin 4.3 3.2 - 4.8 g/dL    Globulin  2.9 2.8 - 4.4 g/dL    A/G Ratio 1.5 1.0 - 2.0   Troponin I (High Sensitivity)   Result Value Ref Range    Troponin I (High Sensitivity) 4 <=53 ng/L   BNP (Brain Natriuretic Peptide)   Result Value Ref Range    Beta Natriuretic Peptide 51 <100 pg/mL   Scan slide   Result Value Ref Range    Slide Review       Platelets reviewed on smear. No giant platelets or platelet clumps observed.   EKG 12 Lead   Result Value Ref Range    Ventricular rate 67 BPM    Atrial rate 67 BPM    P-R Interval 178 ms    QRS Duration 70 ms    Q-T Interval 372 ms    QTC Calculation (Bezet) 393 ms    P Axis 61 degrees    R Axis -9 degrees    T Axis 8 degrees   RAINBOW DRAW BLUE   Result Value Ref Range    Hold Blue Auto Resulted    SARS-CoV-2/Flu A and B/RSV by PCR (GeneXpert)    Specimen: Nares; Other   Result Value Ref Range    SARS-CoV-2 (COVID-19) - (GeneXpert) Not Detected Not Detected    Influenza A by PCR  Negative Negative    Influenza B by PCR Negative Negative    RSV by PCR Negative Negative   CBC W/ DIFFERENTIAL   Result Value Ref Range    WBC 5.2 4.0 - 11.0 x10(3) uL    RBC 2.67 (L) 3.80 - 5.80 x10(6)uL    HGB 4.6 (LL) 13.0 - 17.5 g/dL    HCT 17.3 (L) 39.0 - 53.0 %    MCV 64.8 (L) 80.0 - 100.0 fL    MCH 17.2 (L) 26.0 - 34.0 pg    MCHC 26.6 (L) 31.0 - 37.0 g/dL    RDW-SD 67.4 (H) 35.1 - 46.3 fL    RDW 30.6 (H) 11.0 - 15.0 %    PLT 76.0 (L) 150.0 - 450.0 10(3)uL    Neutrophil Absolute Prelim 3.30 1.50 - 7.70 x10 (3) uL         IMAGING: XR CHEST AP PORTABLE  (CPT=71045)    Result Date: 1/26/2024  CONCLUSION:  1. No acute disease in the chest.    Dictated by (CST): Oleksandr Sanchez MD on 1/26/2024 at 12:32 PM     Finalized by (CST): Oleksandr Sanchez MD on 1/26/2024 at 12:33 PM           I personally reviewed the radiology study and my finding were no acute intrathoracic process      Procedures:   Procedures    EKG: Normal sinus rhythm with a normal rate of 67, normal intervals, normal QRS, nonspecific ST-T wave changes without overt signs of acute ischemia, no old immediately available for comparison       ED COURSE          Re-Evaluation: Improved      Disposition & Plan:   Clinical Impression/Final Diagnosis:   1. Anemia, unspecified type        Medical Decision Making: Patient noted to be profoundly anemic, not tachycardic or hypotensive, at this time not tachypneic either, not short of breath when he is at rest, only exertional.  Concern for symptomatic anemia will emergently transfused 2 units of packed red blood cells.  No active GI bleed GI is on consult will give Protonix.  Admit for further workup and management    Medical Decision Making  Amount and/or Complexity of Data Reviewed  Independent Historian:      Details: Outside hospital records  External Data Reviewed: notes.  Labs: ordered. Decision-making details documented in ED Course.  Radiology: ordered and independent interpretation performed.  Decision-making details documented in ED Course.  ECG/medicine tests: ordered and independent interpretation performed. Decision-making details documented in ED Course.    Risk  OTC drugs.  Prescription drug management.  Decision regarding hospitalization.    Critical Care  Total time providing critical care: 42 minutes        Disposition: Admit  There are no disposition comments on file for this visit.     This note was generated in part using voice recognition dictation technology. The report was reviewed by this physician but still may have unintentional errors due to inherent limitations of voice recognition technology. All times are estimates.      Signed by Apryl Alvarenga MD on 1/26/2024  1:56 PM            H&P - H&P Note        H&P signed by Lelo Mattson DO at 1/26/2024  2:20 PM    CC:   Chief Complaint   Patient presents with    Breathing Problem        PCP: No primary care provider on file.    History of Present Illness: Patient is a 68 year old male with PMH sig for HTN, HLD, and BPH who presented to the hospital with chest discomfort, weakness, and dyspnea on exertion. Patient states that his symptoms started a few weeks prior and have been progressively worsening. He becomes dyspneic when walking up a flight of stairs. Dizziness when standing that resolves with time and also progressive weakness. In addition he noticed that he was having some epigastric abdominal pain that started at the same time. Denied any recent NSAID use but has been taking ASA daily for about 1 year. Having some chest pressure, this he states is chronic and has been w/u in the past. No hx of prior colonoscopy. No family hx of cancer. Denies any unintentional weight loss but has been having night sweats for a few weeks. Appetite is normal. Upon arrival to the ED, initial vitals were stable. Labwork showed hemoglobin of 4.6 ( previous of 13.9) and platelets of 327. Rectal examination performed in the ED had brown stool  with flecks of blood. Patient said that he has not paid attention the the color of his stool so he does not know.     PMH  Past Medical History:   Diagnosis Date    Essential hypertension         Review of Systems  Comprehensive ROS reviewed and negative except for what's stated above.     OBJECTIVE:  /77   Pulse 66   Temp 97.8 °F (36.6 °C)   Resp 16   Ht 5' 7\" (1.702 m)   Wt 170 lb (77.1 kg)   SpO2 100%   BMI 26.63 kg/m²   General:  Alert, no distress   Head:  Normocephalic               Neck: Supple   Lungs:   Clear to auscultation bilaterally.       Heart:  Regular rate and rhythm, S1, S2 normal,   Abdomen:   Soft, epigastric TTP   Extremities: Extremities normal             Diagnostic Data:    CBC/Chem  Recent Labs   Lab 01/26/24  1213   WBC 5.2   HGB 4.6*   MCV 64.8*   PLT 76.0*       Recent Labs   Lab 01/26/24  1148      K 3.9      CO2 24.0   BUN 12   CREATSERUM 0.99   GLU 89   CA 9.3       Recent Labs   Lab 01/26/24  1148   ALT <7*   AST 13   ALB 4.3       No results for input(s): \"TROP\" in the last 168 hours.    Radiology: XR CHEST AP PORTABLE  (CPT=71045)    Result Date: 1/26/2024  CONCLUSION:  1. No acute disease in the chest.    Dictated by (CST): Oleksandr Sanchez MD on 1/26/2024 at 12:32 PM     Finalized by (CST): Oleksandr Sanchez MD on 1/26/2024 at 12:33 PM             ASSESSMENT / PLAN:   Patient is a 68 year old male with PMH sig for HTN, HLD, and BPH who presented to the hospital with chest discomfort, weakness, and dyspnea on exertion.     Acute anemia  - hgb of 4.6 on admission, previous was 13.9 in 2021  - possible UGIB, has epigastric tenderness and takes ASA daily however no hx of prior colonoscopy and has been having night sweats for a few weeks?   - rectal exam in ED with brown stool and flecks of blood  - transfusing 2 units of PRBC  - vitals stable reviewed and stable  - CBC BID  - check iron studies, ferritin, b12  - GI consulted, appreciate recommendations  - CLD, NPO  at midnight  - protonix 40 mg BID    Thrombocytopenia  - platelets of 76 on admission, previous was 327  - check iron studies, PBS, coags  - repeat in the morning, if worsened may need hem onc consult    HTN  - resume home medications    HLD  - resume home medications    BPH  - resume home medications    FN:  - IVF: NS  - Diet: CLD, NPO at midnight    DVT Prophy: SCD  Lines: PIV    Dispo: pending clinical course    Outpatient records or previous hospital records reviewed.     Further recommendations pending patient's clinical course.  [facility] hospitalist to continue to follow patient while in house    Patient and/or patient's family given opportunity to ask questions and note understanding and agreeing with therapeutic plan as outlined    Thank You,  Lelo Mattson DO    Hospitalist with [facility]        Electronically signed by Lelo Mattson DO on 1/26/2024  2:20 PM           CONSULT  ASSESSMENT: In summary this is a 68 year old male who presents today with progressive generalized weakness and severe new microcytic anemia Hb 4.6 (MCV 64.8) (normal CBC 2/2021).      ACTIVE ISSUES INCLUDE:      Severe new microcytic anemia / thrombocytopenia -- ISIS with brown stool with \"flecks of blood.\" No prior endoscopic evaluation. Normal PT / INR; normal LFT's.      -- Ddx: broad including upper gi source and colonic pathology including malignancy.   -- iron studies pending   -- transfuse to Hb > 7   -- EGD / colonoscopy with MAC (monitored anesthesia care) tomorrow or pending transfusion (would transfuse to >7 prior to start of prep).   -- empiric ppi   -- clear liquid diet tonight      2.HTN     3. Thrombocytopenia -- repeat CBC in am          The patient indicates understanding of these issues and agrees to the plan. Thank you!       Alfie Carty M.D.       [facility]    1/27  [facility] Hospitalist Progress Note      CC: Hospital Follow up     PCP: No primary care  provider on file.         Assessment/Plan:   68 year old male with PMH sig for HTN, HLD, and BPH who presented to the hospital with chest discomfort, weakness, and dyspnea on exertion.      Acute anemia  Severe iron deficiency anemia   - hgb of 4.6 on admission, previous was 13.9 in 2021  - transfused 3 units of PRBC  - check iron studies, ferritin, b12  - GI consulted, appreciate recommendations  - ok to resume diet, EGD with normal findings, as was colonoscopy   - will need capsule small bowel study, either inpatient or outpatient  - will see how he is by tomorrow and decide on DC timing  - will give IV iron while here few doses   - protonix 40 mg BID     Thrombocytopenia  - platelets of 76 on admission, previous was 327  - check iron studies, PBS, coags  - repeat in the morning, if worsened may need hem onc consult     HTN  - hold losartan today      HLD  - can hold his statin         Fluids: can stop IV fluids  DVT prophylaxis: hold   Code status: FULL     [facility] [place] [facility] Hospitalist       Subjective:      Feels better today      OBJECTIVE:     Blood pressure 135/81, pulse 61, temperature 97.3 °F (36.3 °C), resp. rate 22, height 5' 7\" (1.702 m), weight 172 lb 13.5 oz (78.4 kg), SpO2 99%.     Temp:  [96.8 °F (36 °C)-98.1 °F (36.7 °C)] 97.3 °F (36.3 °C)  Pulse:  [52-89] 61  Resp:  [12-22] 22  BP: ()/(48-91) 135/81  SpO2:  [97 %-100 %] 99 %        Intake/Output:     Intake/Output Summary (Last 24 hours) at 1/27/2024 1326  Last data filed at 1/27/2024 0323      Gross per 24 hour   Intake 1223.75 ml   Output 300 ml   Net 923.75 ml              Last 3 Weights   01/26/24 2312 172 lb 13.5 oz (78.4 kg)   01/26/24 1044 170 lb (77.1 kg)         /81   Pulse 61   Temp 97.3 °F (36.3 °C)   Resp 22   Ht 5' 7\" (1.702 m)   Wt 172 lb 13.5 oz (78.4 kg)   SpO2 99%   BMI 27.07 kg/m²   General: Alert, no acute distres  Neck: non tender, no adenopathy   Lungs: clear to ausculation  bilaterally  Heart: Regular rate and rhythm  Extremities: No edema           Data Review:       Labs:              Recent Labs   Lab 01/26/24  1213 01/26/24 2013 01/26/24  2319 01/27/24  0300 01/27/24  0943   RBC 2.67*  --  3.36* 3.61* 4.21   HGB 4.6*   < > 6.8* 7.5* 9.0*   HCT 17.3*   < > 23.8* 26.1* 30.5*   MCV 64.8*  --  70.8* 72.3* 72.4*   MCH 17.2*  --  20.2* 20.8* 21.4*   MCHC 26.6*  --  28.6* 28.7* 29.5*   RDW 30.6*  --  30.6* 29.4* 29.9*   NEPRELIM 3.30  --  4.11 3.57  --    WBC 5.2  --  6.6 6.1 7.8   PLT 76.0*  --  94.0* 97.0* 118.0*    < > = values in this interval not displayed.                 Recent Labs   Lab 01/26/24  1148 01/27/24  0300   GLU 89 87   BUN 12 9   CREATSERUM 0.99 0.99   EGFRCR 83 83   CA 9.3 8.8    144   K 3.9 4.3    114*   CO2 24.0 25.0              Recent Labs   Lab 01/26/24  1148 01/27/24  0300   ALT <7* 9*   AST 13 14   ALB 4.3 3.9            Imaging:  XR CHEST AP PORTABLE  (CPT=71045)     Result Date: 1/26/2024  CONCLUSION:         1. No acute disease in the chest.    Dictated by (CST): Oleksandr Sanchez MD on 1/26/2024 at 12:32 PM     Finalized by (CST): Oleksandr Sanchez MD on 1/26/2024 at 12:33 PM               Meds:       sodium chloride   Intravenous Once    pantoprazole  40 mg Intravenous Q12H       lactated ringers      sodium chloride 83 mL/hr at 01/27/24 1324      naloxone, ondansetron, metoclopramide                 1/28  [facility] Hospitalist Progress Note      CC: Hospital Follow up     PCP: No primary care provider on file.         Assessment/Plan:   68 year old male with PMH sig for HTN, HLD, and BPH who presented to the hospital with chest discomfort, weakness, and dyspnea on exertion.      Acute anemia  Severe iron deficiency anemia   - hgb of 4.6 on admission, previous was 13.9 in 2021  - transfused 3 units of PRBC  - checked iron studies, ferritin, b12  - GI consulted, appreciate recommendations  - EGD with normal findings, as was colonoscopy   -  will need capsule small bowel study, patient is agreeable to do here inpatient if offered.  - will give IV iron while here few doses   - protonix 40 mg BID     Thrombocytopenia  - platelets of 76 on admission, previous was 327  - severe iron deficiency noted     HTN  - hold losartan , BP's have been stable      HLD  - resume statin      BPH  -on flomax     Fluids: hold IV fluids  DVT prophylaxis: hold   Code status: FULL  Dispo: patient agreeable to SB capsule if can be done here, Monday. GI to see today     [facility] Hospitalist       Subjective:      Feels ok. Discussed small bowel capsule. Hb 7.8 today. No evidence of melena of hematochezia overnight.      OBJECTIVE:     Blood pressure 123/76, pulse 61, temperature 98 °F (36.7 °C), temperature source Oral, resp. rate 20, height 5' 7\" (1.702 m), weight 172 lb 13.5 oz (78.4 kg), SpO2 99%.     Temp:  [97.3 °F (36.3 °C)-98.2 °F (36.8 °C)] 98 °F (36.7 °C)  Pulse:  [60-83] 61  Resp:  [12-22] 20  BP: (105-148)/(48-88) 123/76  SpO2:  [97 %-100 %] 99 %        Intake/Output:     Intake/Output Summary (Last 24 hours) at 1/28/2024 0948  Last data filed at 1/28/2024 0811      Gross per 24 hour   Intake 1528 ml   Output 400 ml   Net 1128 ml              Last 3 Weights   01/26/24 2312 172 lb 13.5 oz (78.4 kg)   01/26/24 1044 170 lb (77.1 kg)         /76 (BP Location: Right arm)   Pulse 61   Temp 98 °F (36.7 °C) (Oral)   Resp 20   Ht 5' 7\" (1.702 m)   Wt 172 lb 13.5 oz (78.4 kg)   SpO2 99%   BMI 27.07 kg/m²   General: Alert, no acute distres  Neck: non tender, no adenopathy   Lungs: clear to ausculation bilaterally  Heart: Regular rate and rhythm  Extremities: No edema     Data Review:       Labs:               Recent Labs   Lab 01/26/24  1213 01/26/24 2013 01/26/24  2319 01/27/24  0300 01/27/24  0943 01/28/24  0502   RBC 2.67*  --  3.36* 3.61* 4.21 3.77*   HGB 4.6*   < > 6.8* 7.5* 9.0* 7.8*   HCT 17.3*   < > 23.8* 26.1* 30.5* 27.0*   MCV  64.8*  --  70.8* 72.3* 72.4* 71.6*   MCH 17.2*  --  20.2* 20.8* 21.4* 20.7*   MCHC 26.6*  --  28.6* 28.7* 29.5* 28.9*   RDW 30.6*  --  30.6* 29.4* 29.9* 30.5*   NEPRELIM 3.30  --  4.11 3.57  --   --    WBC 5.2  --  6.6 6.1 7.8 9.6   PLT 76.0*  --  94.0* 97.0* 118.0* 176.0    < > = values in this interval not displayed.                  Recent Labs   Lab 01/26/24  1148 01/27/24  0300 01/28/24  0502   GLU 89 87 95   BUN 12 9 9   CREATSERUM 0.99 0.99 1.06   EGFRCR 83 83 77   CA 9.3 8.8 9.1    144 142   K 3.9 4.3 3.7    114* 111   CO2 24.0 25.0 24.0              Recent Labs   Lab 01/26/24  1148 01/27/24  0300   ALT <7* 9*   AST 13 14   ALB 4.3 3.9            Imaging:  XR CHEST AP PORTABLE  (CPT=71045)     Result Date: 1/26/2024  CONCLUSION:         1. No acute disease in the chest.    Dictated by (CST): Oleksandr Sanchez MD on 1/26/2024 at 12:32 PM     Finalized by (CST): Oleksandr Sanchez MD on 1/26/2024 at 12:33 PM               Meds:       tamsulosin  0.4 mg Oral Daily @ 0700    atorvastatin  20 mg Oral Nightly    sodium chloride   Intravenous Once    iron sucrose  200 mg Intravenous Daily    pantoprazole  40 mg Intravenous Q12H         1/29  [place] Hospitalist Progress Note      CC: Hospital Follow up     PCP: No primary care provider on file.         Assessment/Plan:   68 year old male with PMH sig for HTN, HLD, and BPH who presented to the hospital with chest discomfort, weakness, and dyspnea on exertion.      Acute anemia  Severe iron deficiency anemia   - hgb of 4.6 on admission, previous was 13.9 in 2021  - transfused 3 units of PRBC  - checked iron studies, ferritin, b12  - GI consulted, appreciate recommendations  - EGD with normal findings, as was colonoscopy   - capsule small bowel study, patient is agreeable to do here inpatient, started study this AM  - will give IV iron while here few doses (3 were scheduled)  - protonix 40 mg BID     Thrombocytopenia  - platelets of 76 on admission,  previous was 327  - severe iron deficiency noted     HTN  - hold losartan , BP's have been stable      HLD  - resume statin      BPH  -on flomax     DVT prophylaxis: hold   Code status: FULL  Dispo: after small bowel capsule work up completed.      [facility] Hospitalist       Subjective:      Feels ok.     OBJECTIVE:     Blood pressure 112/72, pulse 72, temperature 98 °F (36.7 °C), temperature source Oral, resp. rate 18, height 5' 7\" (1.702 m), weight 172 lb 13.5 oz (78.4 kg), SpO2 99%.     Temp:  [98 °F (36.7 °C)-100.5 °F (38.1 °C)] 98 °F (36.7 °C)  Pulse:  [61-74] 72  Resp:  [18] 18  BP: (112-134)/(68-81) 112/72  SpO2:  [97 %-100 %] 99 %        Intake/Output:     Intake/Output Summary (Last 24 hours) at 1/29/2024 1057  Last data filed at 1/28/2024 2021      Gross per 24 hour   Intake 500 ml   Output --   Net 500 ml              Last 3 Weights   01/26/24 2312 172 lb 13.5 oz (78.4 kg)   01/26/24 1044 170 lb (77.1 kg)         /72 (BP Location: Right arm)   Pulse 72   Temp 98 °F (36.7 °C) (Oral)   Resp 18   Ht 5' 7\" (1.702 m)   Wt 172 lb 13.5 oz (78.4 kg)   SpO2 99%   BMI 27.07 kg/m²   General: Alert, no acute distres  Neck: non tender, no adenopathy   Lungs: clear to ausculation bilaterally  Heart: Regular rate and rhythm  Extremities: No edema     Data Review:       Labs:              Recent Labs   Lab 01/26/24  2319 01/27/24  0300 01/27/24  0943 01/28/24  0502 01/29/24  0604   RBC 3.36* 3.61* 4.21 3.77* 3.79*   HGB 6.8* 7.5* 9.0* 7.8* 8.4*   HCT 23.8* 26.1* 30.5* 27.0* 27.4*   MCV 70.8* 72.3* 72.4* 71.6* 72.3*   MCH 20.2* 20.8* 21.4* 20.7* 22.2*   MCHC 28.6* 28.7* 29.5* 28.9* 30.7*   RDW 30.6* 29.4* 29.9* 30.5*  --    NEPRELIM 4.11 3.57  --   --  8.66*   WBC 6.6 6.1 7.8 9.6 12.1*   PLT 94.0* 97.0* 118.0* 176.0 314.0                  Recent Labs   Lab 01/26/24  1148 01/27/24  0300 01/28/24  0502   GLU 89 87 95   BUN 12 9 9   CREATSERUM 0.99 0.99 1.06   EGFRCR 83 83 77   CA 9.3  8.8 9.1    144 142   K 3.9 4.3 3.7    114* 111   CO2 24.0 25.0 24.0              Recent Labs   Lab 01/26/24  1148 01/27/24  0300   ALT <7* 9*   AST 13 14   ALB 4.3 3.9            Imaging:  XR CHEST AP PORTABLE  (CPT=71045)     Result Date: 1/26/2024  CONCLUSION:         1. No acute disease in the chest.    Dictated by (CST): Oleksandr Sanchez MD on 1/26/2024 at 12:32 PM     Finalized by (CST): Oleksandr Sanchez MD on 1/26/2024 at 12:33 PM               Meds:       tamsulosin  0.4 mg Oral Daily @ 0700    atorvastatin  20 mg Oral Nightly    sodium chloride   Intravenous Once    iron sucrose  200 mg Intravenous Daily    pantoprazole  40 mg Intravenous Q12H            ondansetron, metoclopramide            MEDICATIONS ADMINISTERED IN LAST 1 DAY:  atorvastatin (Lipitor) tab 20 mg       Date Action Dose Route User    1/28/2024 2014 Given 20 mg Oral Janie Acevedo RN          iron sucrose (Venofer) 20 mg/mL injection 200 mg       Date Action Dose Route User    1/29/2024 1102 New Bag 200 mg Intravenous Guillermina Maxwell RN          pantoprazole (Protonix) 40 mg in sodium chloride 0.9% PF 10 mL IV push       Date Action Dose Route User    1/29/2024 0930 Given 40 mg Intravenous Guillermina Maxwell RN    1/28/2024 2014 Given 40 mg Intravenous Janie Acevedo RN            Vitals (last day)       Date/Time Temp Pulse Resp BP SpO2 Weight O2 Device O2 Flow Rate (L/min) Who    01/29/24 0743 -- 72 18 112/72 99 % -- -- -- IK    01/29/24 0529 98 °F (36.7 °C) 61 18 124/68 100 % -- None (Room air) -- DS    01/28/24 1943 98.6 °F (37 °C) -- -- -- -- -- -- -- SP    01/28/24 1928 100.5 °F (38.1 °C) 74 18 134/81 99 % -- None (Room air) -- DS    01/28/24 1237 98.1 °F (36.7 °C) 71 18 124/69 97 % -- None (Room air) -- KA    01/28/24 0426 98 °F (36.7 °C) 61 20 123/76 99 % -- None (Room air) -- DS          CIWA Scores (since admission)       None          Blood Transfusion Record       Product Unit Status Volume Start End             Transfuse RBC       24  [id number]  [id number] Completed 01/27/24 0323 356.67 mL 01/27/24 0136 01/27/24 0323       24  [id number]  [id number] Completed 01/27/24 0324 340 mL 01/26/24 2118 01/26/24 2300       24  [id number]  [id number] Completed 01/26/24 1914 527.08 mL 01/26/24 1447 01/26/24 1900

## 2024-01-29 NOTE — PROGRESS NOTES
[facility], [place]  Gastroenterology Progress Note    Kai Hatfield Patient Status:  Inpatient    1955 MRN [id number]   Location [facility] 4W/SW/SE Attending Gen Lara, DO   Hosp Day # 3 PCP No primary care provider on file.     Subjective:  Kai Hatfield is a(n) 68 year old male.    Current complaints: no current complaints, no n/v, bm, capsule study in progress    Objective:  Blood pressure 112/72, pulse 72, temperature 98 °F (36.7 °C), temperature source Oral, resp. rate 18, height 5' 7\" (1.702 m), weight 172 lb 13.5 oz (78.4 kg), SpO2 99%.  Respiratory: no labored breathing  CV: RRR  Abdomen: nondistended, soft, nontender, bs +  Extremities: no calf tenderness  Neurologic: Ox3    Labs:   Recent Labs   Lab 24  1148 24  1213 24  0300 24  0943 24  0502 24  0604   WBC  --    < > 6.1 7.8 9.6 12.1*   HGB  --    < > 7.5* 9.0* 7.8* 8.4*   HCT  --    < > 26.1* 30.5* 27.0* 27.4*   PLT  --    < > 97.0* 118.0* 176.0 314.0   CREATSERUM 0.99  --  0.99  --  1.06  --    BUN 12  --  9  --  9  --      --  144  --  142  --    K 3.9  --  4.3  --  3.7  --      --  114*  --  111  --    CO2 24.0  --  25.0  --  24.0  --    GLU 89  --  87  --  95  --    CA 9.3  --  8.8  --  9.1  --    ALB 4.3  --  3.9  --   --   --    ALKPHO 58  --  53  --   --   --    BILT 0.7  --  0.9  --   --   --    TP 7.2  --  6.4  --   --   --    AST 13  --  14  --   --   --    ALT <7*  --  9*  --   --   --    PTT 23.3  --   --   --   --   --    INR 1.06  --   --   --   --   --    PTP 14.4  --   --   --   --   --     < > = values in this interval not displayed.       Recent Labs   Lab 24   B12 488        Imaging:  XR CHEST AP PORTABLE  (CPT=71045)    Result Date: 2024  CONCLUSION:  1. No acute disease in the chest.    Dictated by (CST): Oleksandr Sanchez MD on 2024 at 12:32 PM     Finalized by (CST): Oleksandr Sanchez MD on 2024 at 12:33 PM            Problem list:  Patient Active  Problem List   Diagnosis    Thrombocytopenia (HCC)    Anemia, unspecified type       Assessment   Pt is a 68 M presently admitted 1/26/2024 with progressive generalized weakness and severe new microcytic anemia Hb 4.6 (MCV 64.8) (normal CBC 2/2021).     - S/p 1/27/2024: Normal EGD / normal colonoscopy except for a diminutive polyp/internal hemorrhoids. Normal terminal ileum  - wireless capsule endoscopy  in progress  - hemoglobin stable  - Thrombocytopenia --resolved     plan  - venofer as ordered  - continue Ppi iv today  - further recommendations pending capsule study  - follow  daily cbc    Is this a shared or split note between Advanced Practice Provider and Physician? Yes    Dulce Maria Johnson, APRN    1/29/2024  11:24 AM

## 2024-01-29 NOTE — PLAN OF CARE
Problem: Patient Centered Care  Goal: Patient preferences are identified and integrated in the patient's plan of care  Description: Interventions:  - What would you like us to know as we care for you?   - Provide timely, complete, and accurate information to patient/family  - Incorporate patient and family knowledge, values, beliefs, and cultural backgrounds into the planning and delivery of care  - Encourage patient/family to participate in care and decision-making at the level they choose  - Honor patient and family perspectives and choices  Outcome: Progressing     Problem: GASTROINTESTINAL - ADULT  Goal: Maintains or returns to baseline bowel function  Description: INTERVENTIONS:  - Assess bowel function  - Maintain adequate hydration with IV or PO as ordered and tolerated  - Evaluate effectiveness of GI medications  - Encourage mobilization and activity  - Obtain nutritional consult as needed  - Establish a toileting routine/schedule  - Consider collaborating with pharmacy to review patient's medication profile  Outcome: Progressing     Problem: SKIN/TISSUE INTEGRITY - ADULT  Goal: Skin integrity remains intact  Description: INTERVENTIONS  - Assess and document risk factors for pressure ulcer development  - Assess and document skin integrity  - Monitor for areas of redness and/or skin breakdown  - Initiate interventions, skin care algorithm/standards of care as needed  Outcome: Progressing     Problem: Patient/Family Goals  Goal: Patient/Family Long Term Goal  Description: Patient's Long Term Goal:     Interventions:  -   - See additional Care Plan goals for specific interventions  Outcome: Progressing  Goal: Patient/Family Short Term Goal  Description: Patient's Short Term Goal:     Interventions:   -   - See additional Care Plan goals for specific interventions  Outcome: Progressing     Jameel is aware of his plan of care. Patient is alert and oriented x4, room air. Denies any pain. NPO at midnight for  planned endoscopy in AM. Voids WNL. Denies any BM overnight. Independent. Safety measures in place, call light within reach, will continue to monitor.

## 2024-01-30 NOTE — PLAN OF CARE
Pt A/O x4, VSS. Up ad mahin, voiding freely. No signs of bleeding. Tolerating diet. Denying pain. Al ldc information given to pt. All questions answered. Will be going home today.     Problem: Patient Centered Care  Goal: Patient preferences are identified and integrated in the patient's plan of care  Description: Interventions:  - What would you like us to know as we care for you?  - Provide timely, complete, and accurate information to patient/family  - Incorporate patient and family knowledge, values, beliefs, and cultural backgrounds into the planning and delivery of care  - Encourage patient/family to participate in care and decision-making at the level they choose  - Honor patient and family perspectives and choices  Outcome: Adequate for Discharge     Problem: GASTROINTESTINAL - ADULT  Goal: Maintains or returns to baseline bowel function  Description: INTERVENTIONS:  - Assess bowel function  - Maintain adequate hydration with IV or PO as ordered and tolerated  - Evaluate effectiveness of GI medications  - Encourage mobilization and activity  - Obtain nutritional consult as needed  - Establish a toileting routine/schedule  - Consider collaborating with pharmacy to review patient's medication profile  Outcome: Adequate for Discharge     Problem: SKIN/TISSUE INTEGRITY - ADULT  Goal: Skin integrity remains intact  Description: INTERVENTIONS  - Assess and document risk factors for pressure ulcer development  - Assess and document skin integrity  - Monitor for areas of redness and/or skin breakdown  - Initiate interventions, skin care algorithm/standards of care as needed  Outcome: Adequate for Discharge     Problem: Patient/Family Goals  Goal: Patient/Family Long Term Goal  Description: Patient's Long Term Goal: to go home    Interventions:  - Labs/tests as ordered   - Medications as ordered   - Tolerate diet   - Ambulate  - See additional Care Plan goals for specific interventions  Outcome: Adequate for  Discharge  Goal: Patient/Family Short Term Goal  Description: Patient's Short Term Goal:     Interventions:   -   - See additional Care Plan goals for specific interventions  Outcome: Adequate for Discharge     Problem: HEMATOLOGIC - ADULT  Goal: Maintains hematologic stability  Description: INTERVENTIONS  - Assess for signs and symptoms of bleeding or hemorrhage  - Monitor labs and vital signs for trends  - Administer supportive blood products/factors, fluids and medications as ordered and appropriate  - Administer supportive blood products/factors as ordered and appropriate  Outcome: Adequate for Discharge

## 2024-01-30 NOTE — PLAN OF CARE
No acute changes overnight. No complaints from patient. Tolerating diet. No reported bowel movement overnight. Up independently. Plan to discharge home when medically stable.     Problem: Patient Centered Care  Goal: Patient preferences are identified and integrated in the patient's plan of care  Description: Interventions:  - What would you like us to know as we care for you?   - Provide timely, complete, and accurate information to patient/family  - Incorporate patient and family knowledge, values, beliefs, and cultural backgrounds into the planning and delivery of care  - Encourage patient/family to participate in care and decision-making at the level they choose  - Honor patient and family perspectives and choices  Outcome: Progressing     Problem: GASTROINTESTINAL - ADULT  Goal: Maintains or returns to baseline bowel function  Description: INTERVENTIONS:  - Assess bowel function  - Maintain adequate hydration with IV or PO as ordered and tolerated  - Evaluate effectiveness of GI medications  - Encourage mobilization and activity  - Obtain nutritional consult as needed  - Establish a toileting routine/schedule  - Consider collaborating with pharmacy to review patient's medication profile  Outcome: Progressing     Problem: SKIN/TISSUE INTEGRITY - ADULT  Goal: Skin integrity remains intact  Description: INTERVENTIONS  - Assess and document risk factors for pressure ulcer development  - Assess and document skin integrity  - Monitor for areas of redness and/or skin breakdown  - Initiate interventions, skin care algorithm/standards of care as needed  Outcome: Progressing     Problem: HEMATOLOGIC - ADULT  Goal: Maintains hematologic stability  Description: INTERVENTIONS  - Assess for signs and symptoms of bleeding or hemorrhage  - Monitor labs and vital signs for trends  - Administer supportive blood products/factors, fluids and medications as ordered and appropriate  - Administer supportive blood products/factors as  ordered and appropriate  Outcome: Progressing     Problem: Patient/Family Goals  Goal: Patient/Family Long Term Goal  Description: Patient's Long Term Goal: to go home    Interventions:  - Labs/tests as ordered   - Medications as ordered   - Tolerate diet   - Ambulate  - See additional Care Plan goals for specific interventions  Outcome: Progressing  Goal: Patient/Family Short Term Goal  Description: Patient's Short Term Goal:     Interventions:   -   - See additional Care Plan goals for specific interventions  Outcome: Progressing

## 2024-01-30 NOTE — DISCHARGE SUMMARY
General Medicine Discharge Summary     Patient ID:  Kai Hatfield  68 year old  8/4/1955    Admit date: 1/26/2024    Discharge date and time: 1/30/24    Attending Physician: Gen Lara DO     Primary Care Physician: No primary care provider on file.     Discharge Diagnoses:   Severe iron deficiency anemia   Thrombocytopenia  Hypertension   Hyperlipidemia  BPH    Discharge Condition: stable    Disposition: home     Important Follow up:  Alfie Carty MD  [address] [address]  [phone]    Schedule an appointment as soon as possible for a visit in 2 week(s)  gastroenterology follow up    Primary Care follow up in 1 week recommended     Hospital Course:      68 year old male with PMH sig for Hypertension, Hyperlipideima, and BPH who presented to the hospital with chest discomfort, weakness, and dyspnea on exertion.      Acute anemia  Severe iron deficiency anemia   - hgb of 4.6 on admission, previous was 13.9 in 2021  - transfused 3 units of PRBC this admission   - severe iron deficiency anemia noted on labs  - EGD with normal findings, as was colonoscopy   - capsule small bowel study completed here, will need to be followed up on   - given IV iron while here few doses (3 were scheduled)  - protonix 40 mg daily at discharge  - start ferrous sulfate 325mg BID at discharge, discussed with patient. Will need repeat iron studies done in 6-8 weeks.  - would hold aspirin at this time and at discharge, no clear indication for him      Thrombocytopenia  - platelets of 76 on admission, previous was 327  - severe iron deficiency noted     HTN  -can resume losartan at discharge     HLD  - resume statin      BPH  -on flomax      Consults:  IP CONSULT TO GASTROENTEROLOGY    Operative Procedures:   Procedure(s) (LRB):  CAPSULE ENDOSCOPY (N/A)       Patient instructions:        Current Discharge Medication List        START taking these medications     Details   pantoprazole 40 MG Oral Tab EC Take 1 tablet (40 mg total) by mouth daily.      ferrous sulfate 325 (65 FE) MG Oral Tab EC Take 1 tablet (325 mg total) by mouth 2 (two) times daily with meals.           CONTINUE these medications which have NOT CHANGED    Details   losartan 50 MG Oral Tab Take 1 tablet (50 mg total) by mouth daily.      atorvastatin 20 MG Oral Tab Take 1 tablet (20 mg total) by mouth nightly.      tamsulosin 0.4 MG Oral Cap Take 1 capsule (0.4 mg total) by mouth daily.           STOP taking these medications       aspirin 81 MG Oral Chew Tab              Exam on day of discharge:     Vitals:    01/30/24 0607   BP: 125/66   Pulse: 63   Resp: 18   Temp: 98 °F (36.7 °C)   General: no acute distress  Heart: RRR  Lungs: clear bilaterally  Abdomen: nontender, nondistended  Extremities: no pedal edema     Total time coordinating care for discharge: Greater than 30 minutes    Gen Lara DO

## 2024-01-31 NOTE — PROGRESS NOTES
Attempted to reach pt for TCM--listed # rings and rings (20+) with no answer and no option for voicemail or SMS notification. NCM to try again, at another time.    Discharge Dx:   Severe iron deficiency anemia   Thrombocytopenia  Hypertension   Hyperlipidemia  BPH

## 2024-01-31 NOTE — PROGRESS NOTES
Second attempt to reach pt for TCM--listed # rings and rings (20+) with no answer and no option for voicemail or SMS notification.

## 2024-03-25 ENCOUNTER — HOSPITAL ENCOUNTER (OUTPATIENT)
Age: 69
Discharge: HOME | End: 2024-03-25
Payer: MEDICARE

## 2024-03-25 ENCOUNTER — APPOINTMENT (OUTPATIENT)
Dept: GENERAL RADIOLOGY | Age: 69
End: 2024-03-25
Attending: NURSE PRACTITIONER
Payer: MEDICARE

## 2024-03-25 VITALS
TEMPERATURE: 98 F | OXYGEN SATURATION: 100 % | HEART RATE: 72 BPM | RESPIRATION RATE: 20 BRPM | SYSTOLIC BLOOD PRESSURE: 131 MMHG | DIASTOLIC BLOOD PRESSURE: 85 MMHG

## 2024-03-25 DIAGNOSIS — B49: ICD-10-CM

## 2024-03-25 DIAGNOSIS — M01.X0: ICD-10-CM

## 2024-03-25 DIAGNOSIS — M25.511 ACUTE PAIN OF RIGHT SHOULDER: Primary | ICD-10-CM

## 2024-03-25 PROCEDURE — A4565 SLINGS: HCPCS | Performed by: NURSE PRACTITIONER

## 2024-03-25 PROCEDURE — 73030 X-RAY EXAM OF SHOULDER: CPT | Performed by: NURSE PRACTITIONER

## 2024-03-25 PROCEDURE — 99204 OFFICE O/P NEW MOD 45 MIN: CPT | Performed by: NURSE PRACTITIONER

## 2024-03-25 NOTE — DISCHARGE INSTRUCTIONS
Rest, heat versus ice alternate  Sling for daytime comfort x 3 days only  Ibuprofen as needed for pain  Follow-up with orthopedics your doctor  Return for concerns

## 2024-03-25 NOTE — ED INITIAL ASSESSMENT (HPI)
R arm pain after starting a  approx 3 weeks ago. Felt it initially in R elbow. Here b/c pain is throughout R arm. Pain is worse with movement. Unable to lift arm above head due to pain.

## 2024-03-25 NOTE — ED PROVIDER NOTES
Patient Seen in: [facility]      History     Chief Complaint   Patient presents with    Arm Pain     Stated Complaint: arm pain    Subjective:   HPI    68-year-old male, with history of hypertension high cholesterol presents to the immediate care with right shoulder and elbow pain for the last 3 weeks.  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  The pain seems to radiate up to his right outer shoulder.  He has pain with attempting to raise his arm above his head secondary to pain.  No fall.  No other known injury.  No chest pain.  No difficulty breathing.  No medications taken.    Objective:   Past Medical History:   Diagnosis Date    Essential hypertension     High blood pressure     High cholesterol               Past Surgical History:   Procedure Laterality Date    COLONOSCOPY N/A 1/27/2024    Procedure: COLONOSCOPY;  Surgeon: Alfie Carty MD;  Location: [facility] ENDOSCOPY                Social History     Socioeconomic History    Marital status: Single   Tobacco Use    Smoking status: Former     Types: Cigarettes    Smokeless tobacco: Never   Substance and Sexual Activity    Alcohol use: Yes    Drug use: Never     Social Determinants of Health     Food Insecurity: No Food Insecurity (1/27/2024)    Food Insecurity     Food Insecurity: Never true   Transportation Needs: No Transportation Needs (1/27/2024)    Transportation Needs     Lack of Transportation: No   Housing Stability: Low Risk  (1/27/2024)    Housing Stability     Housing Instability: No              Review of Systems    Positive for stated complaint: arm pain  Other systems are as noted in HPI.  Constitutional and vital signs reviewed.      All other systems reviewed and negative except as noted above.    Physical Exam     ED Triage Vitals [03/25/24 0946]   /85   Pulse 72   Resp 20   Temp 98.1 °F (36.7 °C)   Temp src Temporal   SpO2 100 %   O2 Device None (Room air)        Current:/85   Pulse 72   Temp 98.1 °F (36.7 °C) (Temporal)   Resp 20   SpO2 100%         Physical Exam  Vitals and nursing note reviewed.   Constitutional:       Appearance: Normal appearance.   Cardiovascular:      Rate and Rhythm: Normal rate and regular rhythm.      Pulses: Normal pulses.      Heart sounds: Normal heart sounds.   Musculoskeletal:         General: Tenderness and signs of injury present. No swelling. Normal range of motion.      Comments: Point tenderness over the right rotator cuff region with difficulty in extension and external rotation  Mild tenderness to the elbow with supination pronation and flexion motions no swelling  Neurovascularly intact 2+ radial pulse   Skin:     General: Skin is warm and dry.      Capillary Refill: Capillary refill takes less than 2 seconds.      Findings: No erythema or rash.   Neurological:      Mental Status: He is alert.               ED Course   Labs Reviewed - No data to display                   MDM      68-year-old male presents with the above complaints  Musculoskeletal pain tendinitis rotator cuff injury tear tendinitis elbow tendinitis rule out fracture AC joint separation considered arthritis as well as cardiac causes for arm pain  Pain is worse with movement all vital signs are stable  Degenerative changes noted on x-ray no fracture will await radiology read  Heat, Tylenol arthritis, close follow-up with PMD orthopedics if pain persists or worsens advise sooner reevaluation for chest pain difficulty breathing patient should go to the ER                                   Medical Decision Making  Problems Addressed:  Acute pain of right shoulder: acute illness or injury  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 Decision-making details documented in ED Course.    Risk  OTC  drugs.        Disposition and Plan     Clinical Impression:  1. Acute pain of right shoulder    2. 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

## 2024-04-19 ENCOUNTER — HOSPITAL ENCOUNTER (EMERGENCY)
Facility: HOSPITAL | Age: 69
Discharge: HOME | End: 2024-04-19
Attending: EMERGENCY MEDICINE
Payer: MEDICARE

## 2024-04-19 VITALS
RESPIRATION RATE: 16 BRPM | SYSTOLIC BLOOD PRESSURE: 144 MMHG | HEART RATE: 70 BPM | DIASTOLIC BLOOD PRESSURE: 89 MMHG | HEIGHT: 68 IN | WEIGHT: 160 LBS | OXYGEN SATURATION: 98 % | BODY MASS INDEX: 24.25 KG/M2 | TEMPERATURE: 98 F

## 2024-04-19 DIAGNOSIS — M12.811 ROTATOR CUFF ARTHROPATHY, RIGHT: ICD-10-CM

## 2024-04-19 DIAGNOSIS — M25.511 ACUTE PAIN OF RIGHT SHOULDER: Primary | ICD-10-CM

## 2024-04-19 PROCEDURE — 99283 EMERGENCY DEPT VISIT LOW MDM: CPT

## 2024-04-19 PROCEDURE — 99282 EMERGENCY DEPT VISIT SF MDM: CPT

## 2024-04-19 NOTE — ED PROVIDER NOTES
Patient Seen in: [facility] Emergency Department      History     Chief Complaint   Patient presents with    Arm or Hand Injury     R arm pain x1 month     Stated Complaint: Arm Pain    Subjective:   HPI        Objective:   Past Medical History:    Essential hypertension    High blood pressure    High cholesterol              Past Surgical History:   Procedure Laterality Date    Colonoscopy N/A 1/27/2024    Procedure: COLONOSCOPY;  Surgeon: Alfie Carty MD;  Location: [facility] ENDOSCOPY                Social History     Socioeconomic History    Marital status: Single   Tobacco Use    Smoking status: Former     Types: Cigarettes    Smokeless tobacco: Never   Substance and Sexual Activity    Alcohol use: Yes    Drug use: Never     Social Determinants of Health     Food Insecurity: No Food Insecurity (1/27/2024)    Food Insecurity     Food Insecurity: Never true   Transportation Needs: No Transportation Needs (1/27/2024)    Transportation Needs     Lack of Transportation: No   Housing Stability: Low Risk  (1/27/2024)    Housing Stability     Housing Instability: No              Review of Systems    Positive for stated complaint: Arm Pain  Other systems are as noted in HPI.  Constitutional and vital signs reviewed.      All other systems reviewed and negative except as noted above.    Physical Exam     ED Triage Vitals [04/19/24 0929]   /89   Pulse 70   Resp 16   Temp 97.9 °F (36.6 °C)   Temp src    SpO2 98 %   O2 Device        Current:/89   Pulse 70   Temp 97.9 °F (36.6 °C)   Resp 16   Ht 172.7 cm (5' 8\")   Wt 72.6 kg   SpO2 98%   BMI 24.33 kg/m²         Physical Exam          ED Course   Labs Reviewed - No data to display                   MDM      68-year-old male with a history of hypertension and hyperlipidemia presents today with right arm pain.  Patient states has been having pain in the right arm for about a month.  3 weeks ago, he presented to an urgent care for symptoms.  There he had  x-rays performed which did not show acute abnormalities.  Since then, ongoing and slightly worsening pain particularly with movement of the shoulder.  Patient does note repetitive movement of the shoulder at his work.  Denies specific injury, however.    On exam, well-appearing, vitals normal, some tenderness to palpation in the muscles surrounding the shoulder and right upper back.  Pain with range of motion.  Strength and sensation intact distally.  No cervical spine tenderness.    Differential: Rotator cuff injury, muscular strain, arthritis    Patient cannot take NSAIDs orally.  Will recommend a trial of Tylenol and Voltaren cream with instructions to follow-up with orthopedics and with return precautions.                               MDM    Disposition and Plan     Clinical Impression:  1. Acute pain of right shoulder    2. Rotator cuff arthropathy, right         Disposition:  Discharge  4/19/2024 10:57 am    Follow-up:  [address] [address]  Schedule an appointment as soon as possible for a visit      We recommend that you schedule follow up care with a primary care provider within the next three months to obtain basic health screening including reassessment of your blood pressure.      Medications Prescribed:  Discharge Medication List as of 4/19/2024 11:12 AM        START taking these medications    Details   diclofenac 1 % External Gel Apply 2 g topically 4 (four) times daily for 19 days., Normal, Disp-100 Undefined, R-0

## 2024-04-19 NOTE — ED INITIAL ASSESSMENT (HPI)
Pt ambulatory to triage c/o right arm pain x1 month. Pt seen at  approx 2-3 weeks ago and told to come to ER if pain does not improve.

## (undated) NOTE — LETTER
[facility] 2W/SW  [address] [facility] [address]  [phone]    Blood Transfusion Consent    In the course of your treatment, it may become necessary to administer a transfusion of blood or blood components. This form provides basic information concerning this procedure and, if signed by you, authorizes its administration. By signing this form, you agree that all of your questions about the administration of blood or blood products have been answered by the ordering medical professional or designee.    Description of Procedure  Blood is introduced into one of your veins, commonly in the arm, using a sterilized disposable needle. The amount of blood transfused, and whether the transfusion will be of blood or blood components is a judgement the physician will make based on your particular needs.    Risks  The transfusion is a common procedure of low risk.  MINOR AND TEMPORARY REACTIONS ARE NOT UNCOMMON, including a slight bruise, swelling or local reaction in the area where the needle pierces your skin, or a nonserious reaction to the transfused material itself, including headache, fever or mild skin reaction, such as rash.  Serious reactions are possible, though very unlikely, and include severe allergic reaction (shock) and destruction (hemolysis) of transfused blood cells.  Infectious diseases which are known to be transmitted by blood transfusion include certain types of viral Hepatitis(liver infection from a virus), Human Immunodeficiency Virus (HIV-1,2) infection, a viral infection known to cause Acquired Immunodeficiency Syndrome (AIDS), as well as certain other bacterial, viral, and parasitic diseases. While a minimal risk of acquiring an infectious disease from transfused blood exists, in accordance with the Federal and State law, all due care has been taken in donor selection and testing to avoid transmission of disease.    Alternatives  If loss of blood poses serious threats during your  treatment, THERE IS NO EFFECTIVE ALTERNATIVE TO BLOOD TRANSFUSION. However, if you have any further questions on this matter, your provider will fully explain the alternatives to you if it has not already been done.    I, ______________________________, have read/had read to me the above. I understand the matters bearing on the decision whether or not to authorize a transfusion of blood or blood components. I have no questions which have not been answered to my full satisfaction. I hereby consent to such transfusion as my physician may deem necessary or advisable in the course of my treatment.    ______________________________________________                    ___________________________  (Signature of Patient or Responsible party in case of minor,                 (Printed Name of Patient or incompetent, or unconscious patient)              Responsible Party)    ___________________________               _____________________  (Relationship to Patient if not self)                                    (Date and Time)    __________________________                                                           ______________________              (Signature of Witness)               (Printed Name of Witness)     Language line ()    Telephone/Verbal/Video Consent    __________________________                     ____________________  (Signature of 2nd Witness           (Printed Name of 2nd  Telephone/Verbal/Video Consent)           Witness)    Patient Name: Kai Hatfield     : 1955                 Printed: 2024     Medical Record #: [id number]      Rev: 2023

## (undated) NOTE — LETTER
Patient Name: Kai Hatfield        : 1955       Medical Record #: [id number]    CONSENT FOR PROCEDURES/SEDATION    Date: 2024       Time: 2:18 AM        1. I authorize the performance upon Kai Hatfield the following:    __________________________________________________________________________    2. I authorize Dr. Alfie Carty MD (and whomever is designated as the doctor’s assistant), to perform the above mentioned procedures.    3. If any unforeseen conditions arise during this procedure calling for additional procedures, operations, or medications (including anesthesia and blood transfusion), I  further request and authorize the doctor to do whatever he/she deems advisable in my interest.    4. I consent to the taking and reproduction of any photographs in the course of this procedure for professional purposes.    5. I consent to the administration of such sedation as may be considered necessary or advisable by the physician responsible for this service, with the exception of  _____________________________.    6. I have been informed by my doctor of the nature and purpose of this procedure/sedation, possible alternative methods of treatment, risk involved and possible complications.      Signature of Patient:  ___________________________    Signature of person authorized to consent for patient: Relationship to patient:  ___________________________    ___________________    Witness: ____________________     Date: ______________    Provider: ____________________     Date: ______________